# Patient Record
Sex: MALE | Race: BLACK OR AFRICAN AMERICAN | NOT HISPANIC OR LATINO | Employment: UNEMPLOYED | URBAN - METROPOLITAN AREA
[De-identification: names, ages, dates, MRNs, and addresses within clinical notes are randomized per-mention and may not be internally consistent; named-entity substitution may affect disease eponyms.]

---

## 2019-06-24 ENCOUNTER — APPOINTMENT (OUTPATIENT)
Dept: CT IMAGING | Facility: CLINIC | Age: 39
End: 2019-06-24
Attending: EMERGENCY MEDICINE
Payer: MEDICAID

## 2019-06-24 ENCOUNTER — HOSPITAL ENCOUNTER (EMERGENCY)
Facility: CLINIC | Age: 39
Discharge: HOME | End: 2019-06-24
Attending: EMERGENCY MEDICINE | Admitting: EMERGENCY MEDICINE
Payer: MEDICAID

## 2019-06-24 VITALS
OXYGEN SATURATION: 100 % | TEMPERATURE: 98.8 F | RESPIRATION RATE: 18 BRPM | DIASTOLIC BLOOD PRESSURE: 97 MMHG | SYSTOLIC BLOOD PRESSURE: 143 MMHG | HEART RATE: 84 BPM

## 2019-06-24 DIAGNOSIS — N28.9 RENAL INSUFFICIENCY: ICD-10-CM

## 2019-06-24 DIAGNOSIS — N20.0 CALCULUS OF KIDNEY: ICD-10-CM

## 2019-06-24 LAB
ALBUMIN UR-MCNC: 10 MG/DL
ANION GAP SERPL CALCULATED.3IONS-SCNC: 7 MMOL/L (ref 3–14)
APPEARANCE UR: CLEAR
BASOPHILS # BLD AUTO: 0 10E9/L (ref 0–0.2)
BASOPHILS NFR BLD AUTO: 0.3 %
BILIRUB UR QL STRIP: NEGATIVE
BUN SERPL-MCNC: 18 MG/DL (ref 7–30)
CALCIUM SERPL-MCNC: 8.8 MG/DL (ref 8.5–10.1)
CHLORIDE SERPL-SCNC: 107 MMOL/L (ref 94–109)
CO2 SERPL-SCNC: 25 MMOL/L (ref 20–32)
COLOR UR AUTO: YELLOW
CREAT SERPL-MCNC: 1.79 MG/DL (ref 0.66–1.25)
DIFFERENTIAL METHOD BLD: NORMAL
EOSINOPHIL # BLD AUTO: 0.1 10E9/L (ref 0–0.7)
EOSINOPHIL NFR BLD AUTO: 0.8 %
ERYTHROCYTE [DISTWIDTH] IN BLOOD BY AUTOMATED COUNT: 13.3 % (ref 10–15)
GFR SERPL CREATININE-BSD FRML MDRD: 47 ML/MIN/{1.73_M2}
GLUCOSE SERPL-MCNC: 104 MG/DL (ref 70–99)
GLUCOSE UR STRIP-MCNC: NEGATIVE MG/DL
HCT VFR BLD AUTO: 44.2 % (ref 40–53)
HGB BLD-MCNC: 14.8 G/DL (ref 13.3–17.7)
HGB UR QL STRIP: ABNORMAL
IMM GRANULOCYTES # BLD: 0 10E9/L (ref 0–0.4)
IMM GRANULOCYTES NFR BLD: 0.3 %
KETONES UR STRIP-MCNC: ABNORMAL MG/DL
LEUKOCYTE ESTERASE UR QL STRIP: NEGATIVE
LYMPHOCYTES # BLD AUTO: 2.3 10E9/L (ref 0.8–5.3)
LYMPHOCYTES NFR BLD AUTO: 25.4 %
MCH RBC QN AUTO: 29.8 PG (ref 26.5–33)
MCHC RBC AUTO-ENTMCNC: 33.5 G/DL (ref 31.5–36.5)
MCV RBC AUTO: 89 FL (ref 78–100)
MONOCYTES # BLD AUTO: 0.8 10E9/L (ref 0–1.3)
MONOCYTES NFR BLD AUTO: 8.5 %
MUCOUS THREADS #/AREA URNS LPF: PRESENT /LPF
NEUTROPHILS # BLD AUTO: 5.7 10E9/L (ref 1.6–8.3)
NEUTROPHILS NFR BLD AUTO: 64.7 %
NITRATE UR QL: NEGATIVE
NRBC # BLD AUTO: 0 10*3/UL
NRBC BLD AUTO-RTO: 0 /100
PH UR STRIP: 5.5 PH (ref 5–7)
PLATELET # BLD AUTO: 284 10E9/L (ref 150–450)
POTASSIUM SERPL-SCNC: 3.8 MMOL/L (ref 3.4–5.3)
RBC # BLD AUTO: 4.97 10E12/L (ref 4.4–5.9)
RBC #/AREA URNS AUTO: 9 /HPF (ref 0–2)
SODIUM SERPL-SCNC: 139 MMOL/L (ref 133–144)
SOURCE: ABNORMAL
SP GR UR STRIP: 1.02 (ref 1–1.03)
UROBILINOGEN UR STRIP-MCNC: 2 MG/DL (ref 0–2)
WBC # BLD AUTO: 8.9 10E9/L (ref 4–11)
WBC #/AREA URNS AUTO: 4 /HPF (ref 0–5)

## 2019-06-24 PROCEDURE — 25000132 ZZH RX MED GY IP 250 OP 250 PS 637: Performed by: EMERGENCY MEDICINE

## 2019-06-24 PROCEDURE — 74176 CT ABD & PELVIS W/O CONTRAST: CPT

## 2019-06-24 PROCEDURE — 99285 EMERGENCY DEPT VISIT HI MDM: CPT | Mod: 25

## 2019-06-24 PROCEDURE — 80048 BASIC METABOLIC PNL TOTAL CA: CPT | Performed by: EMERGENCY MEDICINE

## 2019-06-24 PROCEDURE — 87591 N.GONORRHOEAE DNA AMP PROB: CPT | Performed by: EMERGENCY MEDICINE

## 2019-06-24 PROCEDURE — 25000128 H RX IP 250 OP 636: Performed by: EMERGENCY MEDICINE

## 2019-06-24 PROCEDURE — 87491 CHLMYD TRACH DNA AMP PROBE: CPT | Performed by: EMERGENCY MEDICINE

## 2019-06-24 PROCEDURE — 96374 THER/PROPH/DIAG INJ IV PUSH: CPT

## 2019-06-24 PROCEDURE — 81001 URINALYSIS AUTO W/SCOPE: CPT | Performed by: EMERGENCY MEDICINE

## 2019-06-24 PROCEDURE — 85025 COMPLETE CBC W/AUTO DIFF WBC: CPT | Performed by: EMERGENCY MEDICINE

## 2019-06-24 PROCEDURE — 96361 HYDRATE IV INFUSION ADD-ON: CPT

## 2019-06-24 RX ORDER — SODIUM CHLORIDE 9 MG/ML
1000 INJECTION, SOLUTION INTRAVENOUS CONTINUOUS
Status: DISCONTINUED | OUTPATIENT
Start: 2019-06-24 | End: 2019-06-25 | Stop reason: HOSPADM

## 2019-06-24 RX ORDER — TAMSULOSIN HYDROCHLORIDE 0.4 MG/1
0.4 CAPSULE ORAL ONCE
Status: COMPLETED | OUTPATIENT
Start: 2019-06-24 | End: 2019-06-24

## 2019-06-24 RX ORDER — KETOROLAC TROMETHAMINE 15 MG/ML
15 INJECTION, SOLUTION INTRAMUSCULAR; INTRAVENOUS ONCE
Status: COMPLETED | OUTPATIENT
Start: 2019-06-24 | End: 2019-06-24

## 2019-06-24 RX ORDER — TAMSULOSIN HYDROCHLORIDE 0.4 MG/1
0.4 CAPSULE ORAL DAILY
Qty: 10 CAPSULE | Refills: 0 | Status: ON HOLD | OUTPATIENT
Start: 2019-06-24 | End: 2019-07-24

## 2019-06-24 RX ORDER — ONDANSETRON 4 MG/1
4 TABLET, ORALLY DISINTEGRATING ORAL EVERY 8 HOURS PRN
Qty: 15 TABLET | Refills: 0 | Status: ON HOLD | OUTPATIENT
Start: 2019-06-24 | End: 2019-07-24

## 2019-06-24 RX ORDER — HYDROCODONE BITARTRATE AND ACETAMINOPHEN 5; 325 MG/1; MG/1
1 TABLET ORAL EVERY 6 HOURS PRN
Qty: 18 TABLET | Refills: 0 | Status: ON HOLD | OUTPATIENT
Start: 2019-06-24 | End: 2019-07-24

## 2019-06-24 RX ADMIN — KETOROLAC TROMETHAMINE 15 MG: 15 INJECTION, SOLUTION INTRAMUSCULAR; INTRAVENOUS at 20:52

## 2019-06-24 RX ADMIN — TAMSULOSIN HYDROCHLORIDE 0.4 MG: 0.4 CAPSULE ORAL at 22:22

## 2019-06-24 RX ADMIN — SODIUM CHLORIDE 1000 ML: 9 INJECTION, SOLUTION INTRAVENOUS at 20:30

## 2019-06-24 NOTE — ED AVS SNAPSHOT
[facility] Emergency Department  [address]  [facility] [address]  Phone:  [phone]  Fax:  [phone]                                    Denver Goff   MRN: [id number]    Department:  [facility] Emergency Department   Date of Visit:  6/24/2019           After Visit Summary Signature Page    I have received my discharge instructions, and my questions have been answered. I have discussed any challenges I see with this plan with the nurse or doctor.    ..........................................................................................................................................  Patient/Patient Representative Signature      ..........................................................................................................................................  Patient Representative Print Name and Relationship to Patient    ..................................................               ................................................  Date                                   Time    ..........................................................................................................................................  Reviewed by Signature/Title    ...................................................              ..............................................  Date                                               Time          22EPIC Rev 08/18

## 2019-06-25 LAB
C TRACH DNA SPEC QL NAA+PROBE: NEGATIVE
N GONORRHOEA DNA SPEC QL NAA+PROBE: NEGATIVE
SPECIMEN SOURCE: NORMAL
SPECIMEN SOURCE: NORMAL

## 2019-06-25 NOTE — ED TRIAGE NOTES
Diagnose with left kidney stones three weeks ago [place], was prescribed bactrim and Tamusolin. Flank pain 2 days ago with burning urination. Tylenol taken at 6pm that did not help. ABCs intact.

## 2019-06-25 NOTE — DISCHARGE INSTRUCTIONS
Drink lots of fluids.  Flomax to help urine flow with kidney stone for the next few days (until you pass the stone).  Strain urine.  Save stone for followup with PCP or urology.  Colace to avoid constipation while taking narcotics for pain and spasm.  Zofran for nausea.     Be seen later this week- either PCP or urology.  Your kidney function was slightly elevated and should get better with passage of the stone.

## 2019-06-25 NOTE — ED PROVIDER NOTES
History     Chief Complaint:    Flank Pain      HPI   Denver Goff is a 38 year old male who presents with left flank pain.  Denver is a 38-year-old gentleman who was diagnosed with a left-sided kidney stone about 3 weeks ago when he was in [facility].  He has been taking Bactrim (antibiotic) even though he does not think he had an infection in addition of Flomax.  He does report burning urination and flank pain starting 2 days ago.  Tylenol is not relieving the pain.  He is felt nauseated but not had any fevers or vomiting is otherwise acting normal.  Patient does not think he has passed the kidney stone.  No fevers.  Mild nausea.  Pain is 4/10 constant.  CT scan from [place] is as noted below:    CT ABD AND PELVIS NO CONTRAST6/8/2019  [facility]  Result Impression     2.2 mm calculus within the proximal left ureter, resulting in mild left hydroureteronephrosis and mild left periureteral stranding    Electronically signed by Abhishek Cortez MD. 6/8/2019 12:33 PM   Result Narrative   EXAM:  CT ABD AND PELVIS NO CONTRAST  EXAM DATE AND TIME:  6/8/2019 11:35 AM    HISTORY: Left Flank Pain    COMPARISON: None available    TECHNIQUE: CT scan of the abdomen and pelvis was performed.  No IV contrast was administered. Oral contrast was not administered. Coronal and sagittal reformats were performed.    FINDINGS:      Limited images through the lung bases demonstrate no consolidation or pleural effusion.    Evaluation of the solid abdominal viscera is limited in the absence of intermediate is contrast.    A 2.2 mm calculus is present within the proximal left ureter, resulting in mild left hydroureteronephrosis and mild left periureteral stranding. No additional left renal or ureteral calculi are identified. The left kidney is grossly unremarkable.    No right hydroureteronephrosis, renal calculus, or ureteral calculus is identified. The right kidney is grossly normal in size and  attenuation.    The visualized portions of the remaining solid abdominal viscera are grossly normal in size and attenuation.    There is no bowel dilatation or obstruction. The appendix is normal in course and caliber. No right lower quadrant inflammatory changes are seen to suggest appendicitis.    There is no ascites or lymphadenopathy.    The urinary bladder is underdistended, limiting evaluation.    No suspicious osseous lesion is identified.   Other Result Information   Interface, [id number] - 06/08/2019 12:35 PM PDT  EXAM:  CT ABD AND PELVIS NO CONTRAST  EXAM DATE AND TIME:  6/8/2019 11:35 AM    HISTORY: Left Flank Pain    COMPARISON: None available    TECHNIQUE: CT scan of the abdomen and pelvis was performed.  No IV contrast was administered. Oral contrast was not administered. Coronal and sagittal reformats were performed.    FINDINGS:      Limited images through the lung bases demonstrate no consolidation or pleural effusion.    Evaluation of the solid abdominal viscera is limited in the absence of intermediate is contrast.    A 2.2 mm calculus is present within the proximal left ureter, resulting in mild left hydroureteronephrosis and mild left periureteral stranding. No additional left renal or ureteral calculi are identified. The left kidney is grossly unremarkable.    No right hydroureteronephrosis, renal calculus, or ureteral calculus is identified. The right kidney is grossly normal in size and attenuation.    The visualized portions of the remaining solid abdominal viscera are grossly normal in size and attenuation.    There is no bowel dilatation or obstruction. The appendix is normal in course and caliber. No right lower quadrant inflammatory changes are seen to suggest appendicitis.    There is no ascites or lymphadenopathy.    The urinary bladder is underdistended, limiting evaluation.    No suspicious osseous lesion is identified.    IMPRESSION:    2.2 mm calculus within the proximal left  "ureter, resulting in mild left hydroureteronephrosis and mild left periureteral stranding    Electronically signed by Abhishek Cortez MD. 6/8/2019 12:33 PM         Allergies:  No Known Allergies     Medications:    Flomax  Bactrim- finished      Past Medical History:    Kidney stones with \"bacteria\"  Double chamber right ventricle   Heart murmur  Chest pain   Shortness of breath   Hyperlipidemia  Overweight      Past surgical history:  Cardiac surgery at age 28    Family History:    Lung Cancer  Lipid disorder  Hypertension      Social History:  The patient was accompanied to the ED by nobody.  Smoking Status: Former Smoker  Smokeless Tobacco: Current User  Alcohol Use: No   Marital Status:         Review of Systems  All other ROS reviewed and negative.  Diagnose with left kidney stones three weeks ago [place], was prescribed bactrim and Tamusolin. Flank pain 2 days ago with burning urination. Tylenol taken at 6pm that did not help.     Physical Exam   First Vitals:  BP: (!) 143/97  Pulse: 84  Heart Rate: 84  Temp: 98.8  F (37.1  C)  Resp: 18  SpO2: 100 %      Physical Exam  GEN: patient appears tired  HEAD: atraumatic, normocephalic  EYES: pupils reactive, conjunctivae normal  ENT: TMs flat and white bilaterally, oropharynx normal with no erythema or exudate, mucus membranes dry  NECK: no cervical LAD  RESPIRATORY: no tachypnea, breath sounds clear to auscultation (no rales, wheezes, rhonchi), chest wall nontender, normal phonation  CVS: normal S1/S2, no murmurs/rubs/gallops  ABDOMEN: soft, nontender, no masses or organomegaly, no rebound, decreased bowel sounds  BACK: no costovertebral angle tenderness  EXTREMITIES: intact pulses x 4, full range of motion at joints, no /edema  MUSCULOSKELETAL: no deformities  SKIN: warm and dry, no acute rashes  NEURO: GCS 15, cranial nerves intact.  Motor- moves all 4 extremities.  Sensation- intact.  Coordination-ambulatory.  Overall symmetrical exam  HEME: no bruising or " petechiae/contusions  LYMPH: no lymphadenopathy    Emergency Department Course         Imaging:  CT scan as per below:    Laboratory:  Results for orders placed or performed during the hospital encounter of 06/24/19 (from the past 24 hour(s))   CBC with platelets differential   Result Value Ref Range    WBC 8.9 4.0 - 11.0 10e9/L    RBC Count 4.97 4.4 - 5.9 10e12/L    Hemoglobin 14.8 13.3 - 17.7 g/dL    Hematocrit 44.2 40.0 - 53.0 %    MCV 89 78 - 100 fl    MCH 29.8 26.5 - 33.0 pg    MCHC 33.5 31.5 - 36.5 g/dL    RDW 13.3 10.0 - 15.0 %    Platelet Count 284 150 - 450 10e9/L    Diff Method Automated Method     % Neutrophils 64.7 %    % Lymphocytes 25.4 %    % Monocytes 8.5 %    % Eosinophils 0.8 %    % Basophils 0.3 %    % Immature Granulocytes 0.3 %    Nucleated RBCs 0 0 /100    Absolute Neutrophil 5.7 1.6 - 8.3 10e9/L    Absolute Lymphocytes 2.3 0.8 - 5.3 10e9/L    Absolute Monocytes 0.8 0.0 - 1.3 10e9/L    Absolute Eosinophils 0.1 0.0 - 0.7 10e9/L    Absolute Basophils 0.0 0.0 - 0.2 10e9/L    Abs Immature Granulocytes 0.0 0 - 0.4 10e9/L    Absolute Nucleated RBC 0.0    Basic metabolic panel   Result Value Ref Range    Sodium 139 133 - 144 mmol/L    Potassium 3.8 3.4 - 5.3 mmol/L    Chloride 107 94 - 109 mmol/L    Carbon Dioxide 25 20 - 32 mmol/L    Anion Gap 7 3 - 14 mmol/L    Glucose 104 (H) 70 - 99 mg/dL    Urea Nitrogen 18 7 - 30 mg/dL    Creatinine 1.79 (H) 0.66 - 1.25 mg/dL    GFR Estimate 47 (L) >60 mL/min/[1.73_m2]    GFR Estimate If Black 54 (L) >60 mL/min/[1.73_m2]    Calcium 8.8 8.5 - 10.1 mg/dL   UA with Microscopic   Result Value Ref Range    Color Urine Yellow     Appearance Urine Clear     Glucose Urine Negative NEG^Negative mg/dL    Bilirubin Urine Negative NEG^Negative    Ketones Urine Trace (A) NEG^Negative mg/dL    Specific Gravity Urine 1.025 1.003 - 1.035    Blood Urine Small (A) NEG^Negative    pH Urine 5.5 5.0 - 7.0 pH    Protein Albumin Urine 10 (A) NEG^Negative mg/dL    Urobilinogen mg/dL  2.0 0.0 - 2.0 mg/dL    Nitrite Urine Negative NEG^Negative    Leukocyte Esterase Urine Negative NEG^Negative    Source Midstream Urine     WBC Urine 4 0 - 5 /HPF    RBC Urine 9 (H) 0 - 2 /HPF    Mucous Urine Present (A) NEG^Negative /LPF   Abd/pelvis CT no contrast - Stone Protocol    Narrative    CT ABDOMEN AND PELVIS WITHOUT CONTRAST  6/24/2019  9:31 PM     HISTORY: Flank pain.    COMPARISON: None.    TECHNIQUE: Axial CT images of the abdomen and pelvis from the  diaphragm to the symphysis pubis were acquired without IV contrast.  Radiation dose for this scan was reduced using automated exposure  control, adjustment of the mA and/or kV according to patient size, or  iterative reconstruction technique.    FINDINGS: 3 mm stone in the distal left ureter 1-2 cm from the  ureterovesicular junction with mild proximal hydronephrosis and  stranding. There is no right perinephric fat stranding. Kidneys are  normal in size and configuration. No other renal, ureteral, or bladder  stones. No free air or free fluid. There are no dilated loops of small  intestine or large bowel to suggest ileus or obstruction. Contracted  gallbladder. Visualized portions of the liver unremarkable. No  splenomegaly. No definite adrenal nodules. Pancreas grossly  unremarkable. The remainder of the visualized abdomen is unremarkable  on this noncontrast scan. Survey of the visualized bony structures  demonstrates no destructive bony lesions. The visualized lung bases  are unremarkable.       Impression    IMPRESSION: 3 mm stone in the distal left ureter with mild proximal  hydronephrosis and stranding.    ANATOLY MAYEN MD         Interventions:  2030 NS Bolus 1,000mL IV   2052 Toradol 15 mg IV  Medications   0.9% sodium chloride BOLUS (1,000 mLs Intravenous New Bag 6/24/19 2030)     Followed by   sodium chloride 0.9% infusion (has no administration in time range)   ketorolac (TORADOL) injection 15 mg (15 mg Intravenous Given 6/24/19 2052)     Patrick  Cardiac/Sp02 monitoring    Emergency Department Course:  10:06 PM recheck    10:17 PM Dr Hardy called back, states ok to followup outpatient    Discussed results with patient.  Gave patient copies of all results (applicable labs, CT scans and/or ultrasounds).  Answered questions.  Asked patient to followup with PCP.  Circled any abnormal lab values and asked patient to followup with PCP.    Impression & Plan             Medical Decision Making:  Ramin is a 38-year-old gentleman who is in [facility] just a couple weeks ago and was diagnosed with a kidney stone.  He reports that he does not think it was passed.  He was able to urinate for us today and his urine shows trace ketones and 9 RBCs but no evidence of infection.  The patient has been taking Bactrim because he was told that he possibly had a UTI and then also taken his course of Flomax and he just presents here because the pain is getting worse.   His CBC is otherwise normal but his BMP today is shown his creatinine is 1.79.  An IV was placed and labs were sent we were able to obtain his old CT scan (see above).  We were able to get his results from from the [facility] and that showed a 2.2 mm stone in the distal left ureter.  Patient received IV fluids and a urine strainer.   CT scan today shows a 3 mm stone in the distal left ureter.  Patient's labs from [facility] are reviewed and his chemistries showed a normal creatinine on 6/ 8.   Patient does have a significant history of having open heart surgery so he essentially is having acute renal insufficiency.    I discussed the case with the urologist and he felt like the patient could go home with close followup.  Patient admits that he is not taking his flomax daily and intermittently.  I stressed pushing fluids and hydration.  Plan- strain urine, flomax, pain medicine.  Patient to followup with urology or PCP in 1-2 days.  Recheck kidney function.    Patient wished for discharge home vs admit and I  agree with the plan.    Diagnosis:  Kidney stones/nephrolothiasis  Acute renal insufficiency    Disposition:  discharged to home  Instructions to patient:  Drink lots of fluids.  Flomax to help urine flow with kidney stone for the next few days (until you pass the stone).  Strain urine.  Save stone for followup with PCP or urology.  Colace to avoid constipation while taking narcotics for pain and spasm.  Zofran for nausea.    Recheck kidney function.    Lorena Hutson  6/24/2019   [facility] EMERGENCY DEPARTMENT       Lorena Hutson MD  06/26/19 0813

## 2019-06-25 NOTE — RESULT ENCOUNTER NOTE
Final result for both N. Gonorrhoeae PCR and Chlamydia Trachomatis PCR are NEGATIVE.  No treatment or change in treatment per [place] ED Lab Result protocol.

## 2019-07-23 ENCOUNTER — APPOINTMENT (OUTPATIENT)
Dept: CT IMAGING | Facility: CLINIC | Age: 39
End: 2019-07-23
Attending: EMERGENCY MEDICINE
Payer: MEDICAID

## 2019-07-23 ENCOUNTER — HOSPITAL ENCOUNTER (OUTPATIENT)
Facility: CLINIC | Age: 39
Setting detail: OBSERVATION
Discharge: HOME | End: 2019-07-24
Attending: EMERGENCY MEDICINE | Admitting: INTERNAL MEDICINE
Payer: MEDICAID

## 2019-07-23 DIAGNOSIS — N23 RENAL COLIC: Primary | ICD-10-CM

## 2019-07-23 DIAGNOSIS — N20.0 KIDNEY STONE: ICD-10-CM

## 2019-07-23 LAB
ALBUMIN UR-MCNC: 20 MG/DL
ANION GAP SERPL CALCULATED.3IONS-SCNC: 7 MMOL/L (ref 3–14)
APPEARANCE UR: CLEAR
BASOPHILS # BLD AUTO: 0 10E9/L (ref 0–0.2)
BASOPHILS NFR BLD AUTO: 0.2 %
BILIRUB UR QL STRIP: NEGATIVE
BUN SERPL-MCNC: 17 MG/DL (ref 7–30)
CALCIUM SERPL-MCNC: 8.9 MG/DL (ref 8.5–10.1)
CHLORIDE SERPL-SCNC: 104 MMOL/L (ref 94–109)
CO2 SERPL-SCNC: 25 MMOL/L (ref 20–32)
COLOR UR AUTO: YELLOW
CREAT SERPL-MCNC: 1.27 MG/DL (ref 0.66–1.25)
DIFFERENTIAL METHOD BLD: NORMAL
EOSINOPHIL # BLD AUTO: 0 10E9/L (ref 0–0.7)
EOSINOPHIL NFR BLD AUTO: 0.3 %
ERYTHROCYTE [DISTWIDTH] IN BLOOD BY AUTOMATED COUNT: 13.3 % (ref 10–15)
GFR SERPL CREATININE-BSD FRML MDRD: 71 ML/MIN/{1.73_M2}
GLUCOSE SERPL-MCNC: 107 MG/DL (ref 70–99)
GLUCOSE UR STRIP-MCNC: NEGATIVE MG/DL
HCT VFR BLD AUTO: 44 % (ref 40–53)
HGB BLD-MCNC: 14.8 G/DL (ref 13.3–17.7)
HGB UR QL STRIP: ABNORMAL
IMM GRANULOCYTES # BLD: 0 10E9/L (ref 0–0.4)
IMM GRANULOCYTES NFR BLD: 0.3 %
KETONES UR STRIP-MCNC: NEGATIVE MG/DL
LEUKOCYTE ESTERASE UR QL STRIP: NEGATIVE
LYMPHOCYTES # BLD AUTO: 1.1 10E9/L (ref 0.8–5.3)
LYMPHOCYTES NFR BLD AUTO: 12.2 %
MCH RBC QN AUTO: 30 PG (ref 26.5–33)
MCHC RBC AUTO-ENTMCNC: 33.6 G/DL (ref 31.5–36.5)
MCV RBC AUTO: 89 FL (ref 78–100)
MONOCYTES # BLD AUTO: 0.8 10E9/L (ref 0–1.3)
MONOCYTES NFR BLD AUTO: 8.4 %
MUCOUS THREADS #/AREA URNS LPF: PRESENT /LPF
NEUTROPHILS # BLD AUTO: 7.2 10E9/L (ref 1.6–8.3)
NEUTROPHILS NFR BLD AUTO: 78.6 %
NITRATE UR QL: NEGATIVE
NRBC # BLD AUTO: 0 10*3/UL
NRBC BLD AUTO-RTO: 0 /100
PH UR STRIP: 5 PH (ref 5–7)
PLATELET # BLD AUTO: 250 10E9/L (ref 150–450)
POTASSIUM SERPL-SCNC: 4 MMOL/L (ref 3.4–5.3)
RBC # BLD AUTO: 4.94 10E12/L (ref 4.4–5.9)
RBC #/AREA URNS AUTO: 19 /HPF (ref 0–2)
SODIUM SERPL-SCNC: 136 MMOL/L (ref 133–144)
SOURCE: ABNORMAL
SP GR UR STRIP: >1.035 (ref 1–1.03)
SQUAMOUS #/AREA URNS AUTO: <1 /HPF (ref 0–1)
TRANS CELLS #/AREA URNS HPF: 1 /HPF (ref 0–1)
UROBILINOGEN UR STRIP-MCNC: NORMAL MG/DL (ref 0–2)
WBC # BLD AUTO: 9.2 10E9/L (ref 4–11)
WBC #/AREA URNS AUTO: 4 /HPF (ref 0–5)

## 2019-07-23 PROCEDURE — 74176 CT ABD & PELVIS W/O CONTRAST: CPT

## 2019-07-23 PROCEDURE — 96374 THER/PROPH/DIAG INJ IV PUSH: CPT

## 2019-07-23 PROCEDURE — 80048 BASIC METABOLIC PNL TOTAL CA: CPT | Performed by: EMERGENCY MEDICINE

## 2019-07-23 PROCEDURE — 96375 TX/PRO/DX INJ NEW DRUG ADDON: CPT

## 2019-07-23 PROCEDURE — 85025 COMPLETE CBC W/AUTO DIFF WBC: CPT | Performed by: EMERGENCY MEDICINE

## 2019-07-23 PROCEDURE — 25000128 H RX IP 250 OP 636: Performed by: EMERGENCY MEDICINE

## 2019-07-23 PROCEDURE — 81001 URINALYSIS AUTO W/SCOPE: CPT | Performed by: EMERGENCY MEDICINE

## 2019-07-23 PROCEDURE — 96361 HYDRATE IV INFUSION ADD-ON: CPT

## 2019-07-23 PROCEDURE — 99285 EMERGENCY DEPT VISIT HI MDM: CPT | Mod: 25

## 2019-07-23 RX ORDER — ONDANSETRON 2 MG/ML
4 INJECTION INTRAMUSCULAR; INTRAVENOUS ONCE
Status: COMPLETED | OUTPATIENT
Start: 2019-07-23 | End: 2019-07-23

## 2019-07-23 RX ORDER — HYDROMORPHONE HYDROCHLORIDE 1 MG/ML
0.5 INJECTION, SOLUTION INTRAMUSCULAR; INTRAVENOUS; SUBCUTANEOUS ONCE
Status: COMPLETED | OUTPATIENT
Start: 2019-07-23 | End: 2019-07-23

## 2019-07-23 RX ADMIN — ONDANSETRON HYDROCHLORIDE 4 MG: 2 INJECTION, SOLUTION INTRAMUSCULAR; INTRAVENOUS at 22:55

## 2019-07-23 RX ADMIN — HYDROMORPHONE HYDROCHLORIDE 0.5 MG: 1 INJECTION, SOLUTION INTRAMUSCULAR; INTRAVENOUS; SUBCUTANEOUS at 22:55

## 2019-07-23 ASSESSMENT — ENCOUNTER SYMPTOMS
DYSURIA: 1
HEMATURIA: 0
NAUSEA: 1
ROS GI COMMENTS: POSITIVE FOR MELENA.
VOMITING: 1
FLANK PAIN: 1

## 2019-07-24 ENCOUNTER — ANESTHESIA (OUTPATIENT)
Dept: SURGERY | Facility: CLINIC | Age: 39
End: 2019-07-24
Payer: MEDICAID

## 2019-07-24 ENCOUNTER — APPOINTMENT (OUTPATIENT)
Dept: GENERAL RADIOLOGY | Facility: CLINIC | Age: 39
End: 2019-07-24
Attending: UROLOGY
Payer: MEDICAID

## 2019-07-24 ENCOUNTER — ANESTHESIA EVENT (OUTPATIENT)
Dept: SURGERY | Facility: CLINIC | Age: 39
End: 2019-07-24
Payer: MEDICAID

## 2019-07-24 VITALS
TEMPERATURE: 97.2 F | BODY MASS INDEX: 30.46 KG/M2 | RESPIRATION RATE: 20 BRPM | OXYGEN SATURATION: 95 % | SYSTOLIC BLOOD PRESSURE: 132 MMHG | HEIGHT: 67 IN | DIASTOLIC BLOOD PRESSURE: 90 MMHG | WEIGHT: 194.1 LBS | HEART RATE: 83 BPM

## 2019-07-24 PROBLEM — N23 RENAL COLIC: Status: ACTIVE | Noted: 2019-07-24

## 2019-07-24 LAB
ANION GAP SERPL CALCULATED.3IONS-SCNC: 5 MMOL/L (ref 3–14)
BUN SERPL-MCNC: 15 MG/DL (ref 7–30)
CALCIUM SERPL-MCNC: 8.3 MG/DL (ref 8.5–10.1)
CHLORIDE SERPL-SCNC: 105 MMOL/L (ref 94–109)
CO2 SERPL-SCNC: 27 MMOL/L (ref 20–32)
COPATH REPORT: NORMAL
CREAT SERPL-MCNC: 1.27 MG/DL (ref 0.66–1.25)
GFR SERPL CREATININE-BSD FRML MDRD: 71 ML/MIN/{1.73_M2}
GLUCOSE SERPL-MCNC: 102 MG/DL (ref 70–99)
INTERPRETATION ECG - MUSE: NORMAL
POTASSIUM SERPL-SCNC: 3.8 MMOL/L (ref 3.4–5.3)
SODIUM SERPL-SCNC: 137 MMOL/L (ref 133–144)

## 2019-07-24 PROCEDURE — 96361 HYDRATE IV INFUSION ADD-ON: CPT

## 2019-07-24 PROCEDURE — 25800030 ZZH RX IP 258 OP 636: Performed by: INTERNAL MEDICINE

## 2019-07-24 PROCEDURE — 25000128 H RX IP 250 OP 636: Performed by: UROLOGY

## 2019-07-24 PROCEDURE — 37000009 ZZH ANESTHESIA TECHNICAL FEE, EACH ADDTL 15 MIN: Performed by: UROLOGY

## 2019-07-24 PROCEDURE — G0378 HOSPITAL OBSERVATION PER HR: HCPCS

## 2019-07-24 PROCEDURE — 52352 CYSTOURETERO W/STONE REMOVE: CPT | Mod: LT | Performed by: UROLOGY

## 2019-07-24 PROCEDURE — 99203 OFFICE O/P NEW LOW 30 MIN: CPT | Mod: 57 | Performed by: UROLOGY

## 2019-07-24 PROCEDURE — 25000125 ZZHC RX 250: Performed by: NURSE ANESTHETIST, CERTIFIED REGISTERED

## 2019-07-24 PROCEDURE — 71000012 ZZH RECOVERY PHASE 1 LEVEL 1 FIRST HR: Performed by: UROLOGY

## 2019-07-24 PROCEDURE — 25000125 ZZHC RX 250: Performed by: UROLOGY

## 2019-07-24 PROCEDURE — 99207 ZZC CDG-CODE CATEGORY CHANGED: CPT | Performed by: INTERNAL MEDICINE

## 2019-07-24 PROCEDURE — 36415 COLL VENOUS BLD VENIPUNCTURE: CPT | Performed by: INTERNAL MEDICINE

## 2019-07-24 PROCEDURE — C2617 STENT, NON-COR, TEM W/O DEL: HCPCS | Performed by: UROLOGY

## 2019-07-24 PROCEDURE — 80048 BASIC METABOLIC PNL TOTAL CA: CPT | Performed by: INTERNAL MEDICINE

## 2019-07-24 PROCEDURE — 99219 ZZC INITIAL OBSERVATION CARE,LEVL II: CPT | Performed by: INTERNAL MEDICINE

## 2019-07-24 PROCEDURE — 88300 SURGICAL PATH GROSS: CPT | Mod: 26 | Performed by: INTERNAL MEDICINE

## 2019-07-24 PROCEDURE — 82365 CALCULUS SPECTROSCOPY: CPT | Performed by: UROLOGY

## 2019-07-24 PROCEDURE — 25500064 ZZH RX 255 OP 636: Performed by: UROLOGY

## 2019-07-24 PROCEDURE — 40000306 ZZH STATISTIC PRE PROC ASSESS II: Performed by: UROLOGY

## 2019-07-24 PROCEDURE — 25000128 H RX IP 250 OP 636: Performed by: INTERNAL MEDICINE

## 2019-07-24 PROCEDURE — 93010 ELECTROCARDIOGRAM REPORT: CPT | Performed by: INTERNAL MEDICINE

## 2019-07-24 PROCEDURE — 96376 TX/PRO/DX INJ SAME DRUG ADON: CPT

## 2019-07-24 PROCEDURE — 36000054 ZZH SURGERY LEVEL 2 W FLUORO 1ST 30 MIN: Performed by: UROLOGY

## 2019-07-24 PROCEDURE — C1769 GUIDE WIRE: HCPCS | Performed by: UROLOGY

## 2019-07-24 PROCEDURE — 88300 SURGICAL PATH GROSS: CPT | Performed by: INTERNAL MEDICINE

## 2019-07-24 PROCEDURE — 40000277 XR SURGERY CARM FLUORO LESS THAN 5 MIN W STILLS: Mod: TC

## 2019-07-24 PROCEDURE — 99207 ZZC APP CREDIT; MD BILLING SHARED VISIT: CPT | Performed by: PHYSICIAN ASSISTANT

## 2019-07-24 PROCEDURE — 37000008 ZZH ANESTHESIA TECHNICAL FEE, 1ST 30 MIN: Performed by: UROLOGY

## 2019-07-24 PROCEDURE — 74420 UROGRAPHY RTRGR +-KUB: CPT | Mod: 26 | Performed by: UROLOGY

## 2019-07-24 PROCEDURE — 25000128 H RX IP 250 OP 636: Performed by: NURSE ANESTHETIST, CERTIFIED REGISTERED

## 2019-07-24 PROCEDURE — 27210794 ZZH OR GENERAL SUPPLY STERILE: Performed by: UROLOGY

## 2019-07-24 PROCEDURE — 52332 CYSTOSCOPY AND TREATMENT: CPT | Mod: LT | Performed by: UROLOGY

## 2019-07-24 PROCEDURE — 36000052 ZZH SURGERY LEVEL 2 EA 15 ADDTL MIN: Performed by: UROLOGY

## 2019-07-24 PROCEDURE — 25000128 H RX IP 250 OP 636: Performed by: EMERGENCY MEDICINE

## 2019-07-24 PROCEDURE — 71000027 ZZH RECOVERY PHASE 2 EACH 15 MINS: Performed by: UROLOGY

## 2019-07-24 DEVICE — STENT URETERAL POLARIS ULTRA 5FRX26CM M0061921230: Type: IMPLANTABLE DEVICE | Status: FUNCTIONAL

## 2019-07-24 RX ORDER — SODIUM CHLORIDE, SODIUM LACTATE, POTASSIUM CHLORIDE, CALCIUM CHLORIDE 600; 310; 30; 20 MG/100ML; MG/100ML; MG/100ML; MG/100ML
INJECTION, SOLUTION INTRAVENOUS CONTINUOUS
Status: CANCELLED | OUTPATIENT
Start: 2019-07-24

## 2019-07-24 RX ORDER — GLYCOPYRROLATE 0.2 MG/ML
INJECTION, SOLUTION INTRAMUSCULAR; INTRAVENOUS PRN
Status: DISCONTINUED | OUTPATIENT
Start: 2019-07-24 | End: 2019-07-24

## 2019-07-24 RX ORDER — DEXAMETHASONE SODIUM PHOSPHATE 4 MG/ML
INJECTION, SOLUTION INTRA-ARTICULAR; INTRALESIONAL; INTRAMUSCULAR; INTRAVENOUS; SOFT TISSUE PRN
Status: DISCONTINUED | OUTPATIENT
Start: 2019-07-24 | End: 2019-07-24

## 2019-07-24 RX ORDER — NALOXONE HYDROCHLORIDE 0.4 MG/ML
.1-.4 INJECTION, SOLUTION INTRAMUSCULAR; INTRAVENOUS; SUBCUTANEOUS
Status: DISCONTINUED | OUTPATIENT
Start: 2019-07-24 | End: 2019-07-24 | Stop reason: HOSPADM

## 2019-07-24 RX ORDER — ACETAMINOPHEN 325 MG/1
650 TABLET ORAL EVERY 4 HOURS PRN
Status: DISCONTINUED | OUTPATIENT
Start: 2019-07-24 | End: 2019-07-24 | Stop reason: HOSPADM

## 2019-07-24 RX ORDER — HYDROCODONE BITARTRATE AND ACETAMINOPHEN 5; 325 MG/1; MG/1
1-2 TABLET ORAL EVERY 4 HOURS PRN
Qty: 5 TABLET | Refills: 0 | Status: SHIPPED | OUTPATIENT
Start: 2019-07-24 | End: 2019-09-05

## 2019-07-24 RX ORDER — ONDANSETRON 4 MG/1
4 TABLET, ORALLY DISINTEGRATING ORAL EVERY 6 HOURS PRN
Status: DISCONTINUED | OUTPATIENT
Start: 2019-07-24 | End: 2019-07-24 | Stop reason: HOSPADM

## 2019-07-24 RX ORDER — TAMSULOSIN HYDROCHLORIDE 0.4 MG/1
0.4 CAPSULE ORAL DAILY
Qty: 9 CAPSULE | Refills: 0 | Status: SHIPPED | OUTPATIENT
Start: 2019-07-24 | End: 2019-09-05

## 2019-07-24 RX ORDER — HYDROMORPHONE HYDROCHLORIDE 1 MG/ML
.3-.5 INJECTION, SOLUTION INTRAMUSCULAR; INTRAVENOUS; SUBCUTANEOUS EVERY 10 MIN PRN
Status: CANCELLED | OUTPATIENT
Start: 2019-07-24

## 2019-07-24 RX ORDER — DIMENHYDRINATE 50 MG/ML
25 INJECTION, SOLUTION INTRAMUSCULAR; INTRAVENOUS
Status: CANCELLED | OUTPATIENT
Start: 2019-07-24

## 2019-07-24 RX ORDER — ATROPA BELLADONNA AND OPIUM 16.2; 3 MG/1; MG/1
SUPPOSITORY RECTAL PRN
Status: DISCONTINUED | OUTPATIENT
Start: 2019-07-24 | End: 2019-07-24 | Stop reason: HOSPADM

## 2019-07-24 RX ORDER — ONDANSETRON 4 MG/1
4 TABLET, ORALLY DISINTEGRATING ORAL EVERY 30 MIN PRN
Status: CANCELLED | OUTPATIENT
Start: 2019-07-24

## 2019-07-24 RX ORDER — AMOXICILLIN 250 MG
1 CAPSULE ORAL 2 TIMES DAILY PRN
Status: DISCONTINUED | OUTPATIENT
Start: 2019-07-24 | End: 2019-07-24 | Stop reason: HOSPADM

## 2019-07-24 RX ORDER — AMOXICILLIN 250 MG
2 CAPSULE ORAL 2 TIMES DAILY PRN
Status: DISCONTINUED | OUTPATIENT
Start: 2019-07-24 | End: 2019-07-24 | Stop reason: HOSPADM

## 2019-07-24 RX ORDER — FENTANYL CITRATE 50 UG/ML
25-50 INJECTION, SOLUTION INTRAMUSCULAR; INTRAVENOUS
Status: CANCELLED | OUTPATIENT
Start: 2019-07-24

## 2019-07-24 RX ORDER — LIDOCAINE HYDROCHLORIDE 10 MG/ML
INJECTION, SOLUTION INFILTRATION; PERINEURAL PRN
Status: DISCONTINUED | OUTPATIENT
Start: 2019-07-24 | End: 2019-07-24

## 2019-07-24 RX ORDER — ONDANSETRON 2 MG/ML
INJECTION INTRAMUSCULAR; INTRAVENOUS PRN
Status: DISCONTINUED | OUTPATIENT
Start: 2019-07-24 | End: 2019-07-24

## 2019-07-24 RX ORDER — ALBUTEROL SULFATE 0.83 MG/ML
2.5 SOLUTION RESPIRATORY (INHALATION) EVERY 4 HOURS PRN
Status: CANCELLED | OUTPATIENT
Start: 2019-07-24

## 2019-07-24 RX ORDER — MEPERIDINE HYDROCHLORIDE 25 MG/ML
12.5 INJECTION INTRAMUSCULAR; INTRAVENOUS; SUBCUTANEOUS
Status: CANCELLED | OUTPATIENT
Start: 2019-07-24

## 2019-07-24 RX ORDER — PROPOFOL 10 MG/ML
INJECTION, EMULSION INTRAVENOUS PRN
Status: DISCONTINUED | OUTPATIENT
Start: 2019-07-24 | End: 2019-07-24

## 2019-07-24 RX ORDER — CEFAZOLIN SODIUM 2 G/100ML
2 INJECTION, SOLUTION INTRAVENOUS
Status: COMPLETED | OUTPATIENT
Start: 2019-07-24 | End: 2019-07-24

## 2019-07-24 RX ORDER — SODIUM CHLORIDE, SODIUM LACTATE, POTASSIUM CHLORIDE, CALCIUM CHLORIDE 600; 310; 30; 20 MG/100ML; MG/100ML; MG/100ML; MG/100ML
INJECTION, SOLUTION INTRAVENOUS CONTINUOUS
Status: DISCONTINUED | OUTPATIENT
Start: 2019-07-24 | End: 2019-07-24 | Stop reason: HOSPADM

## 2019-07-24 RX ORDER — OXYCODONE HYDROCHLORIDE 5 MG/1
5-10 TABLET ORAL
Status: DISCONTINUED | OUTPATIENT
Start: 2019-07-24 | End: 2019-07-24 | Stop reason: HOSPADM

## 2019-07-24 RX ORDER — FENTANYL CITRATE 50 UG/ML
INJECTION, SOLUTION INTRAMUSCULAR; INTRAVENOUS PRN
Status: DISCONTINUED | OUTPATIENT
Start: 2019-07-24 | End: 2019-07-24

## 2019-07-24 RX ORDER — TAMSULOSIN HYDROCHLORIDE 0.4 MG/1
0.4 CAPSULE ORAL DAILY
Status: DISCONTINUED | OUTPATIENT
Start: 2019-07-24 | End: 2019-07-24

## 2019-07-24 RX ORDER — LANOLIN ALCOHOL/MO/W.PET/CERES
3 CREAM (GRAM) TOPICAL
Status: DISCONTINUED | OUTPATIENT
Start: 2019-07-24 | End: 2019-07-24 | Stop reason: HOSPADM

## 2019-07-24 RX ORDER — HYDROMORPHONE HYDROCHLORIDE 1 MG/ML
.3-.5 INJECTION, SOLUTION INTRAMUSCULAR; INTRAVENOUS; SUBCUTANEOUS
Status: DISCONTINUED | OUTPATIENT
Start: 2019-07-24 | End: 2019-07-24 | Stop reason: HOSPADM

## 2019-07-24 RX ORDER — LIDOCAINE 40 MG/G
CREAM TOPICAL
Status: CANCELLED | OUTPATIENT
Start: 2019-07-24

## 2019-07-24 RX ORDER — CEFAZOLIN SODIUM 1 G/50ML
1 INJECTION, SOLUTION INTRAVENOUS SEE ADMIN INSTRUCTIONS
Status: DISCONTINUED | OUTPATIENT
Start: 2019-07-24 | End: 2019-07-24 | Stop reason: HOSPADM

## 2019-07-24 RX ORDER — BISACODYL 10 MG
10 SUPPOSITORY, RECTAL RECTAL DAILY PRN
Status: DISCONTINUED | OUTPATIENT
Start: 2019-07-24 | End: 2019-07-24 | Stop reason: HOSPADM

## 2019-07-24 RX ORDER — NALOXONE HYDROCHLORIDE 0.4 MG/ML
.1-.4 INJECTION, SOLUTION INTRAMUSCULAR; INTRAVENOUS; SUBCUTANEOUS
Status: CANCELLED | OUTPATIENT
Start: 2019-07-24 | End: 2019-07-25

## 2019-07-24 RX ORDER — ONDANSETRON 2 MG/ML
4 INJECTION INTRAMUSCULAR; INTRAVENOUS EVERY 6 HOURS PRN
Status: DISCONTINUED | OUTPATIENT
Start: 2019-07-24 | End: 2019-07-24 | Stop reason: HOSPADM

## 2019-07-24 RX ORDER — ONDANSETRON 2 MG/ML
4 INJECTION INTRAMUSCULAR; INTRAVENOUS EVERY 30 MIN PRN
Status: CANCELLED | OUTPATIENT
Start: 2019-07-24

## 2019-07-24 RX ORDER — EPHEDRINE SULFATE 50 MG/ML
INJECTION, SOLUTION INTRAVENOUS PRN
Status: DISCONTINUED | OUTPATIENT
Start: 2019-07-24 | End: 2019-07-24

## 2019-07-24 RX ORDER — METOPROLOL TARTRATE 1 MG/ML
1-2 INJECTION, SOLUTION INTRAVENOUS EVERY 5 MIN PRN
Status: CANCELLED | OUTPATIENT
Start: 2019-07-24

## 2019-07-24 RX ADMIN — CEFAZOLIN SODIUM 2 G: 2 INJECTION, SOLUTION INTRAVENOUS at 11:54

## 2019-07-24 RX ADMIN — PROPOFOL 150 MG: 10 INJECTION, EMULSION INTRAVENOUS at 12:00

## 2019-07-24 RX ADMIN — SODIUM CHLORIDE, POTASSIUM CHLORIDE, SODIUM LACTATE AND CALCIUM CHLORIDE: 600; 310; 30; 20 INJECTION, SOLUTION INTRAVENOUS at 01:51

## 2019-07-24 RX ADMIN — DEXAMETHASONE SODIUM PHOSPHATE 8 MG: 4 INJECTION, SOLUTION INTRA-ARTICULAR; INTRALESIONAL; INTRAMUSCULAR; INTRAVENOUS; SOFT TISSUE at 12:00

## 2019-07-24 RX ADMIN — FENTANYL CITRATE 50 MCG: 50 INJECTION, SOLUTION INTRAMUSCULAR; INTRAVENOUS at 12:00

## 2019-07-24 RX ADMIN — HYDROMORPHONE HYDROCHLORIDE 0.5 MG: 1 INJECTION, SOLUTION INTRAMUSCULAR; INTRAVENOUS; SUBCUTANEOUS at 01:51

## 2019-07-24 RX ADMIN — FENTANYL CITRATE 50 MCG: 50 INJECTION, SOLUTION INTRAMUSCULAR; INTRAVENOUS at 12:10

## 2019-07-24 RX ADMIN — LIDOCAINE HYDROCHLORIDE 30 MG: 10 INJECTION, SOLUTION INFILTRATION; PERINEURAL at 12:00

## 2019-07-24 RX ADMIN — SODIUM CHLORIDE 1000 ML: 9 INJECTION, SOLUTION INTRAVENOUS at 00:09

## 2019-07-24 RX ADMIN — HYDROMORPHONE HYDROCHLORIDE 0.5 MG: 1 INJECTION, SOLUTION INTRAMUSCULAR; INTRAVENOUS; SUBCUTANEOUS at 05:12

## 2019-07-24 RX ADMIN — MIDAZOLAM 2 MG: 1 INJECTION INTRAMUSCULAR; INTRAVENOUS at 11:54

## 2019-07-24 RX ADMIN — EPHEDRINE SULFATE 10 MG: 50 INJECTION, SOLUTION INTRAVENOUS at 12:06

## 2019-07-24 RX ADMIN — GLYCOPYRROLATE 0.2 MG: 0.2 INJECTION, SOLUTION INTRAMUSCULAR; INTRAVENOUS at 12:00

## 2019-07-24 RX ADMIN — ONDANSETRON 4 MG: 2 INJECTION INTRAMUSCULAR; INTRAVENOUS at 12:00

## 2019-07-24 RX ADMIN — HYDROMORPHONE HYDROCHLORIDE 0.5 MG: 1 INJECTION, SOLUTION INTRAMUSCULAR; INTRAVENOUS; SUBCUTANEOUS at 08:05

## 2019-07-24 ASSESSMENT — MIFFLIN-ST. JEOR: SCORE: 1759.06

## 2019-07-24 NOTE — PLAN OF CARE
PRIMARY DIAGNOSIS: ACUTE RENAL COLIC    OUTPATIENT/OBSERVATION GOALS TO BE MET BEFORE DISCHARGE  1. Pain Status: Improved but still requiring IV narcotics.    2. Tolerating adequate PO diet: NPO     3. Surgical Intervention planned: To consult with urology     4. Cleared by consultants (if involved): Yes    5. Return to near baseline physical activity: Yes    Patient alert and oriented x4. Vitals are Temp: 96.7  F (35.9  C) Temp src: Oral BP: (!) 137/95 Pulse: 74 Heart Rate: 83 Resp: 18 SpO2: 99 % RA. Reports 7/10 pain in left flank that radiates to back; PRN dilaudid given. Denies nausea. LR infusing at 100 ml/hr. Patient up independently in room. Continuing to strain urine. Plan to assist with pain management and consult with urology.     Discharge Planner Nurse   Safe discharge environment identified: Yes  Barriers to discharge: Yes       Entered by: Ariadna Nguyen 07/24/2019      Please review provider order for any additional goals.   Nurse to notify provider when observation goals have been met and patient is ready for discharge.

## 2019-07-24 NOTE — ANESTHESIA PREPROCEDURE EVALUATION
Anesthesia Pre-Procedure Evaluation    Patient: Denver Goff   MRN: [id number] : 1980          Preoperative Diagnosis: Stone    Procedure(s):  CYSTOURETEROSCOPY, WITH HOLMIUM LASER LITHOTRIPSY OF URETERAL CALCULUS AND STENT INSERTION    History reviewed. No pertinent past medical history.  History reviewed. No pertinent surgical history.  Anesthesia Evaluation     .             ROS/MED HX    ENT/Pulmonary:  - neg pulmonary ROS     Neurologic:  - neg neurologic ROS     Cardiovascular: Comment: Double chambered right ventricle        METS/Exercise Tolerance:     Hematologic:  - neg hematologic  ROS       Musculoskeletal:  - neg musculoskeletal ROS       GI/Hepatic:  - neg GI/hepatic ROS       Renal/Genitourinary:  - ROS Renal section negative       Endo: Comment: .Body mass index is 30.4 kg/m .      (+) Obesity, .      Psychiatric:  - neg psychiatric ROS       Infectious Disease:  - neg infectious disease ROS       Malignancy:         Other: Comment: .Lab Test        19          WBC          9.2          8.9           HGB          14.8         14.8          MCV          89           89            PLT          250          284            Lab Test        19          NA           137          136          139           POTASSIUM    3.8          4.0          3.8           CHLORIDE     105          104          107           CO2          27           25           25            BUN          15           17           18            CR           1.27*        1.27*        1.79*         ANIONGAP     5            7            7             LUIGI          8.3*         8.9          8.8           GLC          102*         107*         104*                                   Physical Exam  Normal systems: cardiovascular and pulmonary    Airway   Mallampati: II    Dental  "    Cardiovascular   Rhythm and rate: regular and normal      Pulmonary    breath sounds clear to auscultation            Lab Results   Component Value Date    WBC 9.2 07/23/2019    HGB 14.8 07/23/2019    HCT 44.0 07/23/2019     07/23/2019     07/24/2019    POTASSIUM 3.8 07/24/2019    CHLORIDE 105 07/24/2019    CO2 27 07/24/2019    BUN 15 07/24/2019    CR 1.27 (H) 07/24/2019     (H) 07/24/2019    LUIGI 8.3 (L) 07/24/2019       Preop Vitals  BP Readings from Last 3 Encounters:   07/24/19 (!) 126/91   06/24/19 (!) 143/97    Pulse Readings from Last 3 Encounters:   07/24/19 73   06/24/19 84      Resp Readings from Last 3 Encounters:   07/24/19 16   06/24/19 18    SpO2 Readings from Last 3 Encounters:   07/24/19 100%   06/24/19 100%      Temp Readings from Last 1 Encounters:   07/24/19 96.8  F (36  C) (Temporal)    Ht Readings from Last 1 Encounters:   07/24/19 1.702 m (5' 7\")      Wt Readings from Last 1 Encounters:   07/24/19 88 kg (194 lb 1.6 oz)    Estimated body mass index is 30.4 kg/m  as calculated from the following:    Height as of this encounter: 1.702 m (5' 7\").    Weight as of this encounter: 88 kg (194 lb 1.6 oz).       Anesthesia Plan      History & Physical Review  History and physical reviewed and following examination; no interval change.    ASA Status:  2 .        Plan for General and LMA with Intravenous induction. Maintenance will be Inhalation and Balanced.    PONV prophylaxis:  Ondansetron (or other 5HT-3) and Dexamethasone or Solumedrol       Postoperative Care  Postoperative pain management:  Oral pain medications, Multi-modal analgesia and IV analgesics.      Consents  Anesthetic plan, risks, benefits and alternatives discussed with:  Patient or representative..                 Louis James DO                    .  "

## 2019-07-24 NOTE — DISCHARGE SUMMARY
"[facility]ation [facility] Discharge Summary          Denver Goff MRN# [id number]   Age: 38 year old YOB: 1980     Date of Admission:  7/23/2019  Date of Discharge::  7/24/2019  Admitting Physician:  Brian Patel MD  Discharge Physician:  Lilian Burk PA-C  Primary Physician: No Ref-Primary, Physician     Primary Discharge Diagnoses:   Distal Left Ureteral Stone with Mild Hydronephrosis     Hospital Course:   For detail history, please refer to H & P from 7/23/2019. \"In brief, this is a 38 year old male with history of renal stone disease, surgical repair of double chambered right ventricle, and hyperlipidemia.  He presented to the emergency department last night on 7/23/2019 for evaluation of left flank pain.  On Sarah 10 while in [place], [place] he was diagnosed with left ureter stone after presenting with similar symptoms.  He was treated conservatively in anticipation that the stone would pass spontaneously.  Symptoms resolved.  Shortly after that he moved to the [facility].  On 6/24/2019 he presented here with left-sided flank pain again.  He was diagnosed with ureter stone.  He was treated conservatively in anticipation of stone passing.  Symptoms resolved and he remained symptom-free until last night.  He began having flank pain again.  He came to the emergency department for evaluation.\"    ED work-up here showed stable vital signs. Creatinine was 1.27 with otherwise unremarkable Basic metabolic panel and CBC.  CT of abdomen and pelvis showed a 0.4 mm distal left ureter stone with mild hydronephrosis.     Patient was admitted to the observation unit.  He remained hemodynamically stable and afebrile on room air.  Urology was consulted and patient underwent a cystoscopy, left retrograde pyelogram, interpretation of fluoroscopic images, left ureteroscopy with stone basketing, placement of 5 x 26 double-J left ureteral stent.  Patient was discharged from the " "PACU and instructed to follow up with his PCP within one week for repeat bmp to ensure creatinine has normalized and Urology as instructed.    Procedures/Imaging:     Results for orders placed or performed during the hospital encounter of 07/23/19   CT Abdomen Pelvis w/o Contrast    Narrative    CT ABDOMEN PELVIS W/O CONTRAST  7/23/2019 11:41 PM     INDICATION: Left lower quadrant pain.    TECHNIQUE: Thin axial images through the abdomen and pelvis without  contrast. Coronal reformatted images. Radiation dose for this scan was  reduced using automated exposure control, adjustment of the mA and/or  kV according to patient size, or iterative reconstruction technique.    COMPARISON: 6/24/2019.    FINDINGS: Moderate left hydronephrosis due to a 0.4 cm UVJ stone. It  is unclear if this is the same stone which was seen previously. It may  be minimally larger. The degree of hydronephrosis has minimally  increased. No other urinary stones.    Liver, gallbladder, spleen, pancreas, adrenal glands and kidneys are  otherwise negative without contrast.    Pelvic structures are otherwise negative. No ascites.      Impression    IMPRESSION: Moderate left hydronephrosis due to a 0.4 cm UVJ stone.    PARVIZ CASTRO MD   XR Surgery JEANETTE Fluoro L/T 5 Min w Stills    Narrative    This exam was marked as non-reportable because it will not be read by a   radiologist or a [facility] non-radiologist provider.               Consultations:   Urology    Code Status:   Full    Allergies:   No Known Allergies     Subjective:   Patient reports pain is well controlled.  He denies nausea or emesis this morning.    Physical Exam:   Blood pressure 132/90, pulse 83, temperature 97.2  F (36.2  C), temperature source Temporal, resp. rate 20, height 1.702 m (5' 7\"), weight 88 kg (194 lb 1.6 oz), SpO2 95 %.  General: Alert, interactive, NAD, sitting up in bed, pleasant and cooperative.  HEENT: AT/NC, sclera anicteric, PERRL, EOMI, OP clear with " MMM  Neck: Supple, no JVD   Resp: clear to auscultation bilaterally, no crackles or wheezes  Cardiac: regular rate and rhythm, no murmur  Abdomen: Soft, mild left flank tenderness, nondistended. +BS.  No rebound or guarding.  Extremities: No LE edema  Skin: Warm and dry  Neuro: Alert & oriented x 3, moves all extremities equally   Discharge Medicatios:        Discharge Medication List as of 7/24/2019  1:23 PM      START taking these medications    Details   HYDROcodone-acetaminophen (NORCO) 5-325 MG tablet Take 1-2 tablets by mouth every 4 hours as needed for moderate to severe pain, Disp-5 tablet, R-0, Local Print      tamsulosin (FLOMAX) 0.4 MG capsule Take 1 capsule (0.4 mg) by mouth daily for 9 days, Disp-9 capsule, R-0, E-Prescribe             Instructions Given to Patient as Discharge:     Discharge Procedure Orders   Reason for your hospital stay   Order Comments: You were hospitalized due to kidney stones.  Urology was consulted for stone removal.     Follow-up and recommended labs and tests    Order Comments: Please follow up with PCP within one week.  Please follow up with Urology as instructed.     Activity   Order Comments: Your activity upon discharge: activity as tolerated     Order Specific Question Answer Comments   Is discharge order? Yes      When to contact your care team   Order Comments: Notify for fever >101.5; black or bloody stools; severe, persistent, or worsening nausea, vomiting, abdominal pain or distention, diarrhea, constipation; chest pain, difficulty breathing, swelling; dizziness, weakness, lightheadedness, fainting.  Proceed to the nearest emergency room for any urgent concerns.     Encourage fluids   Order Comments: Encourage fluids at home to keep urine clear to light pink     Diet Instructions   Order Comments: Resume pre procedure diet     Discharge Instructions   Order Comments: Patient to arrange for follow up appointment in 1-2 weeks     Diet   Order Comments: Follow this  diet upon discharge: advance diet as tolerated.     Order Specific Question Answer Comments   Is discharge order? Yes        Pending Tests at Discharge:   Surgical pathology    Discharge Disposition:   Discharged to home     Lilian Burk MS, PA-C  Hospitalist Service  Pager [phone]    >30 minutes was spent in discharge planning, care coordination, physical examination and medication reconciliation on the date of discharge, 7/24/2019

## 2019-07-24 NOTE — ED TRIAGE NOTES
Patient presents to ED due to L flank pain that developed AM     Previous hx kidney stones    Reports taking tylenol approx 1700 without relief

## 2019-07-24 NOTE — OR NURSING
Discharge instructions given and patient and sister verbalized understanding.  Questions answered.  Vital signs stable.  No complaints of pain.  Prescription medication given.  Discharged in stable condition via wheelchair to car.

## 2019-07-24 NOTE — PROGRESS NOTES
ROOM # 206-2    Living Situation (if not independent, order SW consult): Home independently   Facility name:  : [facility], [phone]    Activity level at baseline: Independent   Activity level on admit: Independent       Patient registered to observation; given Patient Bill of Rights; given the opportunity to ask questions about observation status and their plan of care.  Patient has been oriented to the observation room, bathroom and call light is in place.    Discussed discharge goals and expectations with patient/family.

## 2019-07-24 NOTE — DISCHARGE INSTRUCTIONS
GENERAL ANESTHESIA OR SEDATION ADULT DISCHARGE INSTRUCTIONS   SPECIAL PRECAUTIONS FOR 24 HOURS AFTER SURGERY    IT IS NOT UNUSUAL TO FEEL LIGHT-HEADED OR FAINT, UP TO 24 HOURS AFTER SURGERY OR WHILE TAKING PAIN MEDICATION.  IF YOU HAVE THESE SYMPTOMS; SIT FOR A FEW MINUTES BEFORE STANDING AND HAVE SOMEONE ASSIST YOU WHEN YOU GET UP TO WALK OR USE THE BATHROOM.    YOU SHOULD REST AND RELAX FOR THE NEXT 24 HOURS AND YOU MUST MAKE ARRANGEMENTS TO HAVE SOMEONE STAY WITH YOU FOR AT LEAST 24 HOURS AFTER YOUR DISCHARGE.  AVOID HAZARDOUS AND STRENUOUS ACTIVITIES.  DO NOT MAKE IMPORTANT DECISIONS FOR 24 HOURS.    DO NOT DRIVE ANY VEHICLE OR OPERATE MECHANICAL EQUIPMENT FOR 24 HOURS FOLLOWING THE END OF YOUR SURGERY.  EVEN THOUGH YOU MAY FEEL NORMAL, YOUR REACTIONS MAY BE AFFECTED BY THE MEDICATION YOU HAVE RECEIVED.    DO NOT DRINK ALCOHOLIC BEVERAGES FOR 24 HOURS FOLLOWING YOUR SURGERY.    DRINK CLEAR LIQUIDS (APPLE JUICE, GINGER ALE, 7-UP, BROTH, ETC.).  PROGRESS TO YOUR REGULAR DIET AS YOU FEEL ABLE.    YOU MAY HAVE A DRY MOUTH, A SORE THROAT, MUSCLES ACHES OR TROUBLE SLEEPING.  THESE SHOULD GO AWAY AFTER 24 HOURS.    CALL YOUR DOCTOR FOR ANY OF THE FOLLOWING:  SIGNS OF INFECTION (FEVER, GROWING TENDERNESS AT THE SURGERY SITE, A LARGE AMOUNT OF DRAINAGE OR BLEEDING, SEVERE PAIN, FOUL-SMELLING DRAINAGE, REDNESS OR SWELLING.    IT HAS BEEN OVER 8 TO 10 HOURS SINCE SURGERY AND YOU ARE STILL NOT ABLE TO URINATE (PASS WATER).     CYSTOSCOPY DISCHARGE INSTRUCTIONS  [facility]  [address]  [phone]    YOU MAY GO BACK TO YOUR NORMAL DIET AND ACTIVITY, UNLESS YOUR DOCTOR TELLS YOU NOT TO.    FOR THE NEXT TWO DAYS, YOU MAY NOTICE:    SOME BLOOD IN YOUR URINE.  SOME BURNING WHEN YOU URINATE.  AN URGE TO URINATE MORE OFTEN.  BLADDER SPASMS.    THESE ARE NORMAL AFTER THE PROCEDURE.  THEY SHOULD GO AWAY AFTER A DAY OR TWO.  TO RELIEVE THESE PROBLEMS:     DRINK 6 TO 8 LARGE GLASSES OF WATER EACH DAY  (INCLUDES DRINKS AT MEALS).  THIS WILL HELP CLEAR THE URINE.    TAKE WARM BATHS TO RELIEVE PAIN AND BLADDER SPASMS.  DO NOT ADD ANYTHING TO THE BATH WATER.    YOUR DOCTOR MAY PRESCRIBE PAIN MEDICINE.  YOU MAY ALSO TAKE TYLENOL (ACETAMINOPHEN) FOR PAIN.    CALL YOUR SURGEON IF YOU HAVE:    A FEVER OVER 101 DEGREES.  CHECK YOUR TEMPERATURE UNDER YOUR TONGUE.    CHILLS.    FAILURE TO URINATE (NO URINE COMES OUT WHEN YOU TRY TO USE THE TOILET).  TRY SOAKING IN A BATHTUB FULL OF WARM WATER.  IF STILL NO URINE, CALL YOUR DOCTOR.    A LOT OF BLOOD IN THE URINE, OR BLOOD CLOTS LARGER THAN A NICKEL.      PAIN IN THE BACK OR BELLY AREA (ABDOMEN).    PAIN OR SPASMS THAT ARE NOT RELIEVED BY WARM TUB BATHS AND PAIN MEDICINE.      SEVERE PAIN, BURNING OR OTHER PROBLEMS WHILE PASSING URINE.    PAIN THAT GETS WORSE AFTER TWO DAYS.        STENT INFORMATION/DISCHARGE INSTRUCTIONS  [facility]OLOG[facility]  [address]  [phone]    During surgery, a stent may be placed in the ureter.  The ureter is the tube that drains urine from the kidney to the bladder.  The stent is placed to dilate (open) the ureter so stone fragments can pass easily through the ureter or to decrease ureteral swelling after surgery or to relieve an obstruction.      The stent is made of silicone.  The upper end of the stent curls in the kidney while the lower end rests in the bladder.    While the stent is in place you may experience the following symptoms:  Blood and/or small blood clots in the urine  Bladder spasms (frequency and urgency of urination)  Discomfort or aching in the back or side where the stent is  Burning or discomfort at the end of urine stream    To decrease these symptoms you should:  Take antispasmodic medication as prescribed (Detrol, Ditropan, etc.)  Drink plenty of fluids but avoid caffeine and citrus (include cranberry)  If you are having discomfort in back or side, decrease activity    Please call your physician or  the physician on call if you experience:  Fever greater than 101 degrees  Severe pain not relieved by pain medication or rest    Please make an appointment for the removal of the stent according to your physician's instructions.

## 2019-07-24 NOTE — OP NOTE
Procedure Date: 07/24/2019      SURGEON:  Gavino Delarosa MD      PREOPERATIVE DIAGNOSIS:  Left ureteral stone.      POSTOPERATIVE DIAGNOSIS:  Left ureteral stone.      PROCEDURE PERFORMED:  Cystoscopy, left retrograde pyelogram, interpretation of fluoroscopic images, left ureteroscopy with stone basketing, placement of 5 x 26 double-J left ureteral stent.      ANESTHESIA:  General.      COMPLICATIONS:  None.      INDICATIONS FOR PROCEDURE:  Denver Goff is a 38-year-old gentleman with a left distal ureteral stone who now presents for extraction.      DETAILS OF THE PROCEDURE:  The risks and benefits of the procedure were explained in detail to the patient and informed consent was obtained.  The patient was brought to the operating room, placed supine on the table, where he underwent general endotracheal anesthetic.  He was then moved down to the dorsal lithotomy position where he was prepped and draped in a standard sterile fashion.  The procedure began by introducing the 22 Spanish rigid cystoscope through the urethra into the bladder to perform cystoscopy.  There were no urothelial abnormalities identified.  I cannulated the left ureteral orifice with ureteral catheter and performed retrograde pyelogram.  This found hydronephrosis, hydroureter down to the level of the distal ureter.  I passed a Glidewire into the left kidney and backed off the ureteral catheter along with the scope.  Alongside the Glidewire, I passed the rigid ureteroscope through the urethra into the bladder and up the left ureter.  The stone was seen in the very distal left ureter.  I performed ureteroscopy all the way up to the kidney and no other stones were identified.  I went back to the distal ureter and then basketed out the stone intact.  I then reloaded the cystoscope over the Glidewire until the left ureteral orifice was visualized.  I passed a 5 x 26 double-J ureteral stent over the wire.  The wire was pulled back and a good curl  could be seen in the renal pelvis under fluoroscopy.  A good curl was seen in the bladder under direct visualization.  The bladder was drained and the scope was removed.  I placed a B and O suppository at the end the case.      The patient tolerated the procedure without complications.  He went to the post-anesthetic care in good condition.  He will go home from there.  I will see him back next week in the office for stent removal.         MELINA PARRA MD             D: 2019   T: 2019   MT: ERICA      Name:     WAN BLAS   MRN:      [id number]        Account:        [id number]   :      1980           Procedure Date: 2019      Document: [id number]

## 2019-07-24 NOTE — ED NOTES
[facility]  ED Nurse Handoff Report    Denver Goff is a 38 year old male   ED Chief complaint: No chief complaint on file.  . ED Diagnosis:   Final diagnoses:   None     Allergies: No Known Allergies    Code Status: Full Code  Activity level - Baseline/Home:  Independent. Activity Level - Current:   Independent. Lift room needed: No. Bariatric: No   Needed: No   Isolation: No. Infection: Not Applicable.     Vital Signs:   Vitals:    07/23/19 2047 07/23/19 2300   BP: 128/86 123/85   Pulse:  82   Resp: 18    Temp: 98.2  F (36.8  C)    TempSrc: Oral    SpO2: 97%        Cardiac Rhythm:  ,      Pain level: 0-10 Pain Scale: 8  Patient confused: No. Patient Falls Risk: Yes.   Elimination Status: Has voided   Patient Report - Initial Complaint: LLQ abdominal pain and left flank pain. Focused Assessment: Pt presents with continued LLQ abdominal pain and flank pain that is worse today. Pt had similar pain 2 months prior, went to see ED while at different state, known kidney stone. Came back for continued sx a month ago here in ED, sent home with the same kidney stone dx. Came back today for continued sx, has not passed stone and worsened pain and nausea. ABCs intact. A&Ox3.   Tests Performed: labs, CT scan. Abnormal Results:   Labs Ordered and Resulted from Time of ED Arrival Up to the Time of Departure from the ED   ROUTINE UA WITH MICROSCOPIC - Abnormal; Notable for the following components:       Result Value    Specific Gravity Urine >1.035 (*)     Blood Urine Moderate (*)     Protein Albumin Urine 20 (*)     RBC Urine 19 (*)     Mucous Urine Present (*)     All other components within normal limits   BASIC METABOLIC PANEL - Abnormal; Notable for the following components:    Glucose 107 (*)     Creatinine 1.27 (*)     All other components within normal limits   CBC WITH PLATELETS DIFFERENTIAL     CT Abdomen Pelvis w/o Contrast   Final Result   IMPRESSION: Moderate left hydronephrosis due  to a 0.4 cm UVJ stone.      PARVIZ CASTRO MD        .   Treatments provided: IV dilaudid, IV fluids, IV zofran  Family Comments: NA  OBS brochure/video discussed/provided to patient:  Yes  ED Medications:   Medications   0.9% sodium chloride BOLUS (1,000 mLs Intravenous New Bag 7/24/19 0009)   ondansetron (ZOFRAN) injection 4 mg (4 mg Intravenous Given 7/23/19 2255)   HYDROmorphone (PF) (DILAUDID) injection 0.5 mg (0.5 mg Intravenous Given 7/23/19 2255)     Drips infusing:  Yes  For the majority of the shift, the patient's behavior Green. Interventions performed were NA.     Severe Sepsis OR Septic Shock Diagnosis Present: No      ED Nurse Name/Phone Number: Jeanette Browning,   12:24 AM    RECEIVING UNIT ED HANDOFF REVIEW    Above ED Nurse Handoff Report was reviewed: Yes  Reviewed by: Ariadna Nguyen on July 24, 2019 at 12:41 AM

## 2019-07-24 NOTE — H&P
[facility]  History and Physical   Hospitalist Service    Brian Patel MD    Denver Goff MRN# [id number]   YOB: 1980 Age: 38 year old      Date of Admission:  7/23/2019           Assessment and Plan:   Denver Goff is a 38-year-old male with history of renal stone disease, surgical repair of double chambered right ventricle, and hyperlipidemia.  He presented to the emergency department last night on 7/23/2019 for evaluation of left flank pain.  On Sarah 10 while in [place], [place] he was diagnosed with left ureter stone after presenting with similar symptoms.  He was treated conservatively in anticipation that the stone would pass spontaneously.  Symptoms resolved.  Shortly after that he moved to the [place].  On 6/24/2019 he presented here with left-sided flank pain again.  He was diagnosed with ureter stone.  He was treated conservatively in anticipation of stone passing.  Symptoms resolved and he remained symptom-free until last night.  He began having flank pain again.  He came to the emergency department for evaluation.  Work-up here showed stable vital signs.  Basic metabolic panel and CBC were unremarkable.  CT of abdomen and pelvis showed a 0.4 mm distal left ureter stone with mild hydronephrosis.  Denver is feeling better after treatment in the emergency department.  I was asked to admit him patient to observation for urology evaluation of ureter stone that has failed 6 weeks of conservative therapy and now has associated mild hydronephrosis.    Problem list:    1.  Distal left ureter stone with mild hydronephrosis.  This is been present for at least 6 weeks.  It seems to have failed conservative treatment.  Admit to observation.  Keep n.p.o.  Isauro G6 and antiemetics will be available for use as needed.  Hydrate with lactated Ringer's.  Strain urine.  Consult urology.    Full code  Ambulate for DVT prophylaxis  Disposition: Admit to observation            Code Status:   Full Code         Primary Care Physician:   No Ref-Primary, Physician None         Chief Complaint:   Left flank pain    History is obtained from patient, Dr. Cox, and the medical record         History of Present Illness:   Denver Goff is a 38-year-old male with history of renal stone disease, surgical repair of double chambered right ventricle, and hyperlipidemia.  He presented to the emergency department last night on 7/23/2019 for evaluation of left flank pain.  On Sarah 10 while in [place], [place] he was diagnosed with left ureter stone after presenting with similar symptoms.  He was treated conservatively in anticipation that the stone would pass spontaneously.  Symptoms resolved.  Shortly after that he moved to the [facility].  On 6/24/2019 he presented here with left-sided flank pain again.  He was diagnosed with ureter stone.  He was treated conservatively in anticipation of stone passing.  Symptoms resolved and he remained symptom-free until last night.  He began having flank pain again.  He came to the emergency department for evaluation.  Work-up here showed stable vital signs.  Basic metabolic panel and CBC were unremarkable.  CT of abdomen and pelvis showed a 0.4 mm distal left ureter stone with mild hydronephrosis.  Denver is feeling better after treatment in the emergency department.  I was asked to admit him patient to observation for urology evaluation of ureter stone that has failed 6 weeks of conservative therapy and now has associated mild hydronephrosis.           Past Medical History:     Patient Active Problem List   Diagnosis     Renal colic   double chambered right ventricle (congenital)  Dyslipidemia          Past Surgical History:   Surgical correction of double chambered right ventricle         Home Medications:   Recent Flomax and Vicodin         Allergies:   No Known Allergies         Social History:   Single  Former [place] smoker  Does not use  alcohol          Family History:   Dyslipidemia  Lung cancer  Hypertension           Review of Systems:   The 10 point Review of Systems is negative other than as noted in the HPI.           Physical Exam:   Blood pressure 123/85, pulse 82, temperature 98.2  F (36.8  C), temperature source Oral, resp. rate 18, SpO2 97 %.  0 lbs 0 oz      GENERAL: Pleasant and cooperative. No acute distress.  EYES: Pupils equal and round. No scleral erythema or icterus.  ENT: External ears are normal without deformity. Posterior oropharynx is without erythem, swelling, or exudate.  NECK: Supple. No masses or swelling. No tenderness. Thyroid is normal without mass or tenderness.  CHEST: Clear to auscultation. Normal breath sounds. No retractions.   CV: Regular rate and rhythm. No JVD. Pulses normal.  ABDOMEN: Bowel sounds present. No tenderness. No masses or hernia.  EXTREMETIES: No clubbing, cyanosis, or ischemia.  SKIN: Warm and dry to touch. No wounds or rashes.  NEUROLOGIC: Strength and sensation are normal. Deep tendon reflexes are normal. Cranial nerves are normal.              Data:   All new lab and imaging data was reviewed.     Results for orders placed or performed during the hospital encounter of 07/23/19 (from the past 24 hour(s))   Routine UA with microscopic   Result Value Ref Range    Color Urine Yellow     Appearance Urine Clear     Glucose Urine Negative NEG^Negative mg/dL    Bilirubin Urine Negative NEG^Negative    Ketones Urine Negative NEG^Negative mg/dL    Specific Gravity Urine >1.035 (H) 1.003 - 1.035    Blood Urine Moderate (A) NEG^Negative    pH Urine 5.0 5.0 - 7.0 pH    Protein Albumin Urine 20 (A) NEG^Negative mg/dL    Urobilinogen mg/dL Normal 0.0 - 2.0 mg/dL    Nitrite Urine Negative NEG^Negative    Leukocyte Esterase Urine Negative NEG^Negative    Source Midstream Urine     WBC Urine 4 0 - 5 /HPF    RBC Urine 19 (H) 0 - 2 /HPF    Squamous Epithelial /HPF Urine <1 0 - 1 /HPF    Transitional Epi 1 0 - 1  /HPF    Mucous Urine Present (A) NEG^Negative /LPF   CBC with platelets differential   Result Value Ref Range    WBC 9.2 4.0 - 11.0 10e9/L    RBC Count 4.94 4.4 - 5.9 10e12/L    Hemoglobin 14.8 13.3 - 17.7 g/dL    Hematocrit 44.0 40.0 - 53.0 %    MCV 89 78 - 100 fl    MCH 30.0 26.5 - 33.0 pg    MCHC 33.6 31.5 - 36.5 g/dL    RDW 13.3 10.0 - 15.0 %    Platelet Count 250 150 - 450 10e9/L    Diff Method Automated Method     % Neutrophils 78.6 %    % Lymphocytes 12.2 %    % Monocytes 8.4 %    % Eosinophils 0.3 %    % Basophils 0.2 %    % Immature Granulocytes 0.3 %    Nucleated RBCs 0 0 /100    Absolute Neutrophil 7.2 1.6 - 8.3 10e9/L    Absolute Lymphocytes 1.1 0.8 - 5.3 10e9/L    Absolute Monocytes 0.8 0.0 - 1.3 10e9/L    Absolute Eosinophils 0.0 0.0 - 0.7 10e9/L    Absolute Basophils 0.0 0.0 - 0.2 10e9/L    Abs Immature Granulocytes 0.0 0 - 0.4 10e9/L    Absolute Nucleated RBC 0.0    Basic metabolic panel   Result Value Ref Range    Sodium 136 133 - 144 mmol/L    Potassium 4.0 3.4 - 5.3 mmol/L    Chloride 104 94 - 109 mmol/L    Carbon Dioxide 25 20 - 32 mmol/L    Anion Gap 7 3 - 14 mmol/L    Glucose 107 (H) 70 - 99 mg/dL    Urea Nitrogen 17 7 - 30 mg/dL    Creatinine 1.27 (H) 0.66 - 1.25 mg/dL    GFR Estimate 71 >60 mL/min/[1.73_m2]    GFR Estimate If Black 82 >60 mL/min/[1.73_m2]    Calcium 8.9 8.5 - 10.1 mg/dL   CT Abdomen Pelvis w/o Contrast    Narrative    CT ABDOMEN PELVIS W/O CONTRAST  7/23/2019 11:41 PM     INDICATION: Left lower quadrant pain.    TECHNIQUE: Thin axial images through the abdomen and pelvis without  contrast. Coronal reformatted images. Radiation dose for this scan was  reduced using automated exposure control, adjustment of the mA and/or  kV according to patient size, or iterative reconstruction technique.    COMPARISON: 6/24/2019.    FINDINGS: Moderate left hydronephrosis due to a 0.4 cm UVJ stone. It  is unclear if this is the same stone which was seen previously. It may  be minimally larger.  The degree of hydronephrosis has minimally  increased. No other urinary stones.    Liver, gallbladder, spleen, pancreas, adrenal glands and kidneys are  otherwise negative without contrast.    Pelvic structures are otherwise negative. No ascites.      Impression    IMPRESSION: Moderate left hydronephrosis due to a 0.4 cm UVJ stone.    PARVIZ CASTRO MD

## 2019-07-24 NOTE — PLAN OF CARE
PRIMARY DIAGNOSIS: ACUTE RENAL COLIC    OUTPATIENT/OBSERVATION GOALS TO BE MET BEFORE DISCHARGE  1. Pain Status: Improved but still requiring IV narcotics.    2. Tolerating adequate PO diet: No- NPO    3. Surgical Intervention planned: Yes on OR for 11 am    4. Cleared by consultants (if involved): No    5. Return to near baseline physical activity: Yes    Discharge Planner Nurse   Safe discharge environment identified: Yes  Barriers to discharge: No       Entered by: Savannah Berrios 07/24/2019 10:14 AM     Please review provider order for any additional goals.   Nurse to notify provider when observation goals have been met and patient is ready for discharge.    Patient is alert and oriented. Patient complains of L flank pain and was given IV dilaudid which is effective, patient denies nausea. Patient has 0.4cm stone in L UVJ per CT . Plan is IVF, Pain control, and urology consult to go to the OR. VSS. Will continue to monitor.

## 2019-07-24 NOTE — ANESTHESIA CARE TRANSFER NOTE
Patient: Denver Goff    Procedure(s):  CYSTOURETEROSCOPY, LITHOTRIPSY OF LEFT URETERAL CALCULUS AND STENT INSERTION    Diagnosis: Stone  Diagnosis Additional Information: No value filed.    Anesthesia Type:   General, LMA     Note:  Airway :LMA  Patient transferred to:PACU  Handoff Report: Identifed the Patient, Identified the Reponsible Provider, Reviewed the pertinent medical history, Discussed the surgical course, Reviewed Intra-OP anesthesia mangement and issues during anesthesia, Set expectations for post-procedure period and Allowed opportunity for questions and acknowledgement of understanding      Vitals: (Last set prior to Anesthesia Care Transfer)    CRNA VITALS  7/24/2019 1157 - 7/24/2019 1233      7/24/2019             Pulse:  81    SpO2:  100 %    Resp Rate (observed):  5  (Abnormal)                 Electronically Signed By: DEVYN Phillips CRNA  July 24, 2019  12:33 PM

## 2019-07-24 NOTE — ED PROVIDER NOTES
History     Chief Complaint:  Flank pain       HPI   Denver Goff is a 38 year old male with a history of kidney stones, double chambered right ventricle, hyperlipidemia, and obesity, who presents with left sided flank pain that began 1 day ago, prompting the patient to visit the ED. The patient describes that his pain radiates from his left flank to his lower abdomen.The patient reports vomiting twice today and has been nauseous, though is not currently nauseous while here.  He endorses that movement makes his pain worse. He also notes some dysuria, but denies hematuria. He also reports 1 episode of melena today and denies a previous history of this in the past. He states that he took Tylenol 6 hours ago with no relief of his symptoms. He is unsure whether he has had a fever. He denies any penile discharge, penile pain, or scrotal pain. To note, the patient was recently diagnosed with a left sided kidney stone 6 weeks ago and was seen in the ED on 6/28/19 where he was found to have renal insufficiency. He denies passing this stone over the past 6 weeks.     Allergies:  NDKA     Medications:    Flomax     Past Medical History:    Renal calculus  Obesity  Double chambered right ventricle   Hyperlipidemia  Obesity  Kidney stones    Past Surgical History:    History reviewed. No pertinent surgical history.    Family History:    Hypercholesteremia  Lung cancer  HTN    Social History:  Smoking status: former  Alcohol use: no   PCP: Physician No Ref-Primary  Marital Status:  Single      Review of Systems   Gastrointestinal: Positive for nausea and vomiting.        Positive for melena.   Genitourinary: Positive for dysuria and flank pain. Negative for discharge, hematuria, penile pain, testicular pain and urgency.   All other systems reviewed and are negative.        Physical Exam     Patient Vitals for the past 24 hrs:   BP Temp Temp src Heart Rate Resp SpO2   07/23/19 2047 128/86 98.2  F (36.8  C) Oral 78 18  97 %        Physical Exam    HEENT:  mmm  Neck: supple  CV: ppi, regular   Resp: speaking in full sentences with any resp distress  Abd: mild tenderness in the LLQ area and L CVA  Ext: peripheral edema present:  No  Skin: warm dry well perfused  Neuro: Alert, no gross motor or sensory deficits,  gait stable        Emergency Department Course     Imaging:  Radiographic findings were communicated with the patient who voiced understanding of the findings.    CT Abdomen Pelvis w/o Contrast  Moderate left hydronephrosis due to a 0.4 cm UVJ stone.  As read by Radiology.    Laboratory:  BMP: Glucose 107 (H), creatinine 1.27 (H), o/w WNL  CBC: WBC 9.2, HGB 14.8,      UA: specific gravity >1.035 (H), blood moderate, albumin 20, RBC 19 (H), mucous present, o/w negative    Interventions:  2255: Zofran 4 mg IV  2255: Dilaudid 0.5 mg IV  0009: NS 1L IV Bolus     Emergency Department Course:  : Nursing notes and vitals reviewed. I performed an exam of the patient as documented above.     IV inserted. Medicine administered as documented above. Blood drawn. This was sent to the lab for further testing, results above. The patient provided a urine sample here in the emergency department. This was sent for laboratory testing, findings above.     The patient was sent for an abdomen pelvis CT while in the emergency department, findings above.     0000: I rechecked the patient and discussed the results of his workup thus far.     I consulted with the hospitalist services. They are in agreement to accept the patient for admission, further evaluation, and treatment.     Findings and plan explained to the Patient who consents to admission..    Impression & Plan      Medical Decision Makin-year-old male who with left lower quadrant pain seen last month for distal ureteral stone had a period of being symptom-free now returning with left lower quadrant pain radiating towards the left lower back found to have persistent  hydronephrosis and distal left ureteral stone given that there was no other nephrolithiasis seen on his previous scan this is likely the same stone which is has not passed spontaneously.  Given he is gone at least a month without passage of this relatively small stone will recommend symptom control overnight urologic consultation in the morning for definitive management.  No associated acute renal failure or infection.    Critical Care time:  none    Diagnosis:    ICD-10-CM    1. Kidney stone N20.0        Disposition:  Admitted to the hospital.     Sherry MILLER, am serving as a scribe at 10:45 PM on 7/23/2019 to document services personally performed by Jj Cox MD based on my observations and the provider's statements to me.       Sherry Mcallister  7/23/2019   [facility] EMERGENCY DEPARTMENT       Jj Cox MD  07/24/19 0107

## 2019-07-24 NOTE — ANESTHESIA POSTPROCEDURE EVALUATION
Patient: Denver Goff    Procedure(s):  CYSTOURETEROSCOPY, LITHOTRIPSY OF LEFT URETERAL CALCULUS AND STENT INSERTION    Diagnosis:Stone  Diagnosis Additional Information: No value filed.    Anesthesia Type:  General, LMA    Note:  Anesthesia Post Evaluation    Patient location during evaluation: PACU  Patient participation: Able to fully participate in evaluation  Level of consciousness: awake  Pain management: adequate  Airway patency: patent  Cardiovascular status: acceptable  Respiratory status: acceptable  Hydration status: acceptable  PONV: controlled     Anesthetic complications: None          Last vitals:  Vitals:    07/24/19 1310 07/24/19 1324 07/24/19 1400   BP:  120/79 132/90   Pulse:      Resp: 30  20   Temp:  97.1  F (36.2  C) 97.2  F (36.2  C)   SpO2: 99% 97% 95%         Electronically Signed By: Louis James DO  July 24, 2019  2:05 PM

## 2019-07-24 NOTE — CONSULTS
Consult Date:  07/24/2019      REASON FOR CONSULTATION:  Left ureteral stone.      HISTORY OF PRESENT ILLNESS:  Denver Goff is a 38-year-old gentleman who was initially diagnosed with a left-sided ureteral stone when he was visiting family in [place], [place] on 06/09.  He was sent home with an antibiotic as well as Flomax.  However, he did not think he actually had an infection at that time.  He was then in our emergency department here on 06/24 with complaints of continued pain on the left side.  He had a CT scan performed that showed a 2.5 mm stone in the distal left ureter.  He was sent home again with Flomax in hopes that he would pass the stone and overall did well, but then recently had recurrence of his left-sided flank pain, so he came back to the Emergency Department again yesterday evening.  He had another CT scan and again the same 3 mm stone was seen in the distal left ureter.  He was admitted for pain control.  His pain has continued since being admitted overnight.  He has had no fevers, chills, nausea, or vomiting.  He has no prior stone history.      PAST MEDICAL HISTORY:  He has no chronic medical problems.  He was diagnosed with a double chamber right ventricle.      PAST SURGICAL HISTORY:  None.      HOME MEDICATIONS:  Flomax only.      ALLERGIES:  NO KNOWN DRUG ALLERGIES.      SOCIAL HISTORY:  He is a former smoker.      FAMILY HISTORY:  No family history of kidney stones.      REVIEW OF SYSTEMS:  He did have some nausea and vomiting earlier that has now resolved.  He has had no fevers or chills.  Left flank pain continues.      PHYSICAL EXAMINATION:   VITAL SIGNS:  He is currently afebrile.  Vital signs stable.   GENERAL:  Currently alert and oriented, in no acute distress.   HEENT:  Normocephalic and atraumatic.   RESPIRATORY:  Normal nonlabored breathing.   ABDOMEN:  Soft, nontender, nondistended.      IMAGING STUDIES:  I reviewed his CT scan images from today as well as from 06/24.  In  both studies, he has a small distal left ureteral stone.  The stone appears to have made minimal progress since .      IMPRESSION AND PLAN:  This is a 38-year-old gentleman with a distal left ureteral stone.  We discussed his treatment options of trial of passage versus treating the stone.  We kept him on the fact that the stone was diagnosed over 7 weeks ago and he has been trying to pass the stone the entire time.  Given the lengthy period of trial of passage, he wishes to treat the stone today.  I recommended cystoscopy with left-sided ureteroscopy, holmium laser lithotripsy, stone basketing and stent placement.  I discussed the procedure in detail with him along with its risks and expected recovery.  He wishes to proceed.  We will proceed to the operating room later today.  The nurses will strain his urine on the floor in the interim to make certain he does not pass the stone.         MELINA PARRA MD             D: 2019   T: 2019   MT: ALMA      Name:     WAN BLAS   MRN:      [id number]        Account:       [id number]   :      1980           Consult Date:  2019      Document: [id number]

## 2019-07-27 LAB
APPEARANCE STONE: NORMAL
COMPN STONE: NORMAL
NUMBER STONE: 1
SIZE STONE: NORMAL MM
WT STONE: 16 MG

## 2019-08-01 ENCOUNTER — OFFICE VISIT (OUTPATIENT)
Dept: UROLOGY | Facility: CLINIC | Age: 39
End: 2019-08-01
Payer: MEDICAID

## 2019-08-01 VITALS
WEIGHT: 185 LBS | BODY MASS INDEX: 29.03 KG/M2 | SYSTOLIC BLOOD PRESSURE: 110 MMHG | HEIGHT: 67 IN | HEART RATE: 82 BPM | OXYGEN SATURATION: 97 % | DIASTOLIC BLOOD PRESSURE: 76 MMHG

## 2019-08-01 DIAGNOSIS — N20.0 NEPHROLITHIASIS: ICD-10-CM

## 2019-08-01 DIAGNOSIS — Z79.2 PROPHYLACTIC ANTIBIOTIC: Primary | ICD-10-CM

## 2019-08-01 PROCEDURE — 99213 OFFICE O/P EST LOW 20 MIN: CPT | Mod: 25 | Performed by: UROLOGY

## 2019-08-01 PROCEDURE — 52310 CYSTOSCOPY AND TREATMENT: CPT | Performed by: UROLOGY

## 2019-08-01 RX ORDER — CIPROFLOXACIN 500 MG/1
500 TABLET, FILM COATED ORAL ONCE
Qty: 1 TABLET | Refills: 0 | Status: SHIPPED | OUTPATIENT
Start: 2019-08-01 | End: 2019-09-05

## 2019-08-01 RX ORDER — LIDOCAINE HYDROCHLORIDE 20 MG/ML
JELLY TOPICAL ONCE
Status: ACTIVE | OUTPATIENT
Start: 2019-08-01

## 2019-08-01 ASSESSMENT — PAIN SCALES - GENERAL: PAINLEVEL: NO PAIN (0)

## 2019-08-01 ASSESSMENT — MIFFLIN-ST. JEOR: SCORE: 1717.78

## 2019-08-01 NOTE — PATIENT INSTRUCTIONS

## 2019-08-01 NOTE — LETTER
Date:August 2, 2019      Patient was self referred, no letter generated. Do not send.        [facility] Information

## 2019-08-01 NOTE — NURSING NOTE
Chief Complaint   Patient presents with     Cystoscopy     Pt here for stent removal     Prior to the start of the procedure and with procedural staff participation, I verbally confirmed the patient s identity using two indicators, relevant allergies, that the procedure was appropriate and matched the consent or emergent situation, and that the correct equipment/implants were available. Immediately prior to starting the procedure I conducted the Time Out with the procedural staff and re-confirmed the patient s name, procedure, and site/side. I have wiped the patient off with the povidone-Iodine solution, draped them,  used Lidocaine hydrochloride jelly, and instilled sterile water into the bladder. (The Joint Commission universal protocol was followed.)  Yes    Sedation (Moderate or Deep): None  5mL 2% lidocaine hydrochloride Urojet instilled into urethra.    NDC# [address]-79  Lot #: [id number]  Expiration Date:  12/20    Rossy Marrero

## 2019-08-01 NOTE — LETTER
"8/1/2019       RE: Denver Goff  [address]  [facility] [address]     Dear Colleague,    Thank you for referring your patient, Denver Goff, to the [facility]. Please see a copy of my visit note below.    Office Visit Note  [facility]  [phone]    UROLOGIC DIAGNOSES:   Left ureteral stone    CURRENT INTERVENTIONS:   S/P Extraction    HISTORY:   This is a 38-year-old gentleman who is in the hospital last week with a 3 mm distal left ureteral stone that had been in place since early June. He underwent stone extraction. Stone was composed of calcium oxalate and calcium phosphate. Serum calcium levels are normal. He has felt well since his procedure and is here today for stent removal.      PAST MEDICAL HISTORY: History reviewed. No pertinent past medical history.    PAST SURGICAL HISTORY:   Past Surgical History:   Procedure Laterality Date     CYSTOSCOPY, RETROGRADES, EXTRACT STONE, INSERT STENT, COMBINED Left 7/24/2019    Procedure: Cystoscopy, left retrograde pyelogram, interpretation of fluoroscopic images, left ureteroscopy with stone basketing, placement of 5 x 26 double-J left ureteral stent, laser on stand-by;  Surgeon: Gavino Delarosa MD;  Location:  OR       FAMILY HISTORY: History reviewed. No pertinent family history.    SOCIAL HISTORY:   Social History     Tobacco Use     Smoking status: Former Smoker     Types: Hookah     Smokeless tobacco: Never Used   Substance Use Topics     Alcohol use: Not Currently       Current Outpatient Medications   Medication     ciprofloxacin (CIPRO) 500 MG tablet     tamsulosin (FLOMAX) 0.4 MG capsule     HYDROcodone-acetaminophen (NORCO) 5-325 MG tablet     No current facility-administered medications for this visit.          PHYSICAL EXAM:    /76   Pulse 82   Ht 1.702 m (5' 7\")   Wt 83.9 kg (185 lb)   SpO2 97%  "  BMI 28.98 kg/m       Constitutional: Well developed. Conversant and in no acute distress  Eyes: Anicteric sclera, conjunctiva clear, normal extraocular movements  ENT: Normocephalic and atraumatic,   Skin: Warm and dry. No rashes or lesions  Cardiac: No peripheral edema  Back/Flank: Not done  CNS/PNS: Normal musculature and movements, moves all extremities normally  Respiratory: Normal non-labored breathing  Abdomen: Soft nontender and nondistended  Peripheral Vascular: No peripheral edema  Mental Status/Psych: Alert and Oriented x 3. Normal mood and affect    Penis: Not done  Scrotal Skin: Not done  Testicles: Not done  Epididymis: Not done  Digital Rectal Exam:     Cystoscopy: I performed flexible cystoscopy today and the stent was removed without difficulty    Imaging: None    Urinalysis: UA RESULTS:  Recent Labs   Lab Test 07/23/19 2053   COLOR Yellow   APPEARANCE Clear   URINEGLC Negative   URINEBILI Negative   URINEKETONE Negative   SG >1.035*   UBLD Moderate*   URINEPH 5.0   PROTEIN 20*   NITRITE Negative   LEUKEST Negative   RBCU 19*   WBCU 4       PSA:     Post Void Residual:     Other labs: None today      IMPRESSION:  Doing well, stent removed    PLAN:  We discussed prevention methods for future stones.He will follow-up with me as needed in the future.    Total Time: 15 minutes                                      Total in Consultation: 10 minutes      Gavino Delarosa M.D.              Again, thank you for allowing me to participate in the care of your patient.      Sincerely,    Gavino Delarosa MD

## 2019-08-01 NOTE — PROGRESS NOTES
"Office Visit Note  [facility]  [phone]    UROLOGIC DIAGNOSES:   Left ureteral stone    CURRENT INTERVENTIONS:   S/P Extraction    HISTORY:   This is a 38-year-old gentleman who is in the hospital last week with a 3 mm distal left ureteral stone that had been in place since early June. He underwent stone extraction. Stone was composed of calcium oxalate and calcium phosphate. Serum calcium levels are normal. He has felt well since his procedure and is here today for stent removal.      PAST MEDICAL HISTORY: History reviewed. No pertinent past medical history.    PAST SURGICAL HISTORY:   Past Surgical History:   Procedure Laterality Date     CYSTOSCOPY, RETROGRADES, EXTRACT STONE, INSERT STENT, COMBINED Left 7/24/2019    Procedure: Cystoscopy, left retrograde pyelogram, interpretation of fluoroscopic images, left ureteroscopy with stone basketing, placement of 5 x 26 double-J left ureteral stent, laser on stand-by;  Surgeon: Gavino Delarosa MD;  Location:  OR       FAMILY HISTORY: History reviewed. No pertinent family history.    SOCIAL HISTORY:   Social History     Tobacco Use     Smoking status: Former Smoker     Types: Hookah     Smokeless tobacco: Never Used   Substance Use Topics     Alcohol use: Not Currently       Current Outpatient Medications   Medication     ciprofloxacin (CIPRO) 500 MG tablet     tamsulosin (FLOMAX) 0.4 MG capsule     HYDROcodone-acetaminophen (NORCO) 5-325 MG tablet     No current facility-administered medications for this visit.          PHYSICAL EXAM:    /76   Pulse 82   Ht 1.702 m (5' 7\")   Wt 83.9 kg (185 lb)   SpO2 97%   BMI 28.98 kg/m      Constitutional: Well developed. Conversant and in no acute distress  Eyes: Anicteric sclera, conjunctiva clear, normal extraocular movements  ENT: Normocephalic and atraumatic,   Skin: Warm and dry. No rashes or lesions  Cardiac: No peripheral edema  Back/Flank: Not done  CNS/PNS: Normal musculature and " movements, moves all extremities normally  Respiratory: Normal non-labored breathing  Abdomen: Soft nontender and nondistended  Peripheral Vascular: No peripheral edema  Mental Status/Psych: Alert and Oriented x 3. Normal mood and affect    Penis: Not done  Scrotal Skin: Not done  Testicles: Not done  Epididymis: Not done  Digital Rectal Exam:     Cystoscopy: I performed flexible cystoscopy today and the stent was removed without difficulty    Imaging: None    Urinalysis: UA RESULTS:  Recent Labs   Lab Test 07/23/19 2053   COLOR Yellow   APPEARANCE Clear   URINEGLC Negative   URINEBILI Negative   URINEKETONE Negative   SG >1.035*   UBLD Moderate*   URINEPH 5.0   PROTEIN 20*   NITRITE Negative   LEUKEST Negative   RBCU 19*   WBCU 4       PSA:     Post Void Residual:     Other labs: None today      IMPRESSION:  Doing well, stent removed    PLAN:  We discussed prevention methods for future stones.He will follow-up with me as needed in the future.    Total Time: 15 minutes                                      Total in Consultation: 10 minutes      Gavino Delarosa M.D.

## 2019-09-05 ENCOUNTER — OFFICE VISIT (OUTPATIENT)
Dept: FAMILY MEDICINE | Facility: CLINIC | Age: 39
End: 2019-09-05
Payer: MEDICAID

## 2019-09-05 VITALS
TEMPERATURE: 98.2 F | DIASTOLIC BLOOD PRESSURE: 85 MMHG | HEIGHT: 67 IN | WEIGHT: 196.2 LBS | HEART RATE: 69 BPM | SYSTOLIC BLOOD PRESSURE: 120 MMHG | BODY MASS INDEX: 30.79 KG/M2 | OXYGEN SATURATION: 99 %

## 2019-09-05 DIAGNOSIS — Z13.220 LIPID SCREENING: ICD-10-CM

## 2019-09-05 DIAGNOSIS — Q24.9 CONGENITAL HEART ANOMALY: ICD-10-CM

## 2019-09-05 DIAGNOSIS — Z00.00 ROUTINE HISTORY AND PHYSICAL EXAMINATION OF ADULT: Primary | ICD-10-CM

## 2019-09-05 DIAGNOSIS — Z13.1 SCREENING FOR DIABETES MELLITUS: ICD-10-CM

## 2019-09-05 LAB
BASOPHILS # BLD AUTO: 0 10E9/L (ref 0–0.2)
BASOPHILS NFR BLD AUTO: 0.3 %
DIFFERENTIAL METHOD BLD: NORMAL
EOSINOPHIL # BLD AUTO: 0.1 10E9/L (ref 0–0.7)
EOSINOPHIL NFR BLD AUTO: 1 %
ERYTHROCYTE [DISTWIDTH] IN BLOOD BY AUTOMATED COUNT: 14.5 % (ref 10–15)
HBA1C MFR BLD: 5 % (ref 0–5.6)
HCT VFR BLD AUTO: 42.9 % (ref 40–53)
HGB BLD-MCNC: 14.9 G/DL (ref 13.3–17.7)
LYMPHOCYTES # BLD AUTO: 2.8 10E9/L (ref 0.8–5.3)
LYMPHOCYTES NFR BLD AUTO: 40.5 %
MCH RBC QN AUTO: 31 PG (ref 26.5–33)
MCHC RBC AUTO-ENTMCNC: 34.7 G/DL (ref 31.5–36.5)
MCV RBC AUTO: 89 FL (ref 78–100)
MONOCYTES # BLD AUTO: 0.6 10E9/L (ref 0–1.3)
MONOCYTES NFR BLD AUTO: 9.2 %
NEUTROPHILS # BLD AUTO: 3.4 10E9/L (ref 1.6–8.3)
NEUTROPHILS NFR BLD AUTO: 49 %
PLATELET # BLD AUTO: 289 10E9/L (ref 150–450)
RBC # BLD AUTO: 4.81 10E12/L (ref 4.4–5.9)
WBC # BLD AUTO: 7 10E9/L (ref 4–11)

## 2019-09-05 PROCEDURE — 36415 COLL VENOUS BLD VENIPUNCTURE: CPT | Performed by: INTERNAL MEDICINE

## 2019-09-05 PROCEDURE — 80061 LIPID PANEL: CPT | Performed by: INTERNAL MEDICINE

## 2019-09-05 PROCEDURE — 85025 COMPLETE CBC W/AUTO DIFF WBC: CPT | Performed by: INTERNAL MEDICINE

## 2019-09-05 PROCEDURE — 83036 HEMOGLOBIN GLYCOSYLATED A1C: CPT | Performed by: INTERNAL MEDICINE

## 2019-09-05 PROCEDURE — 80048 BASIC METABOLIC PNL TOTAL CA: CPT | Performed by: INTERNAL MEDICINE

## 2019-09-05 PROCEDURE — 99385 PREV VISIT NEW AGE 18-39: CPT | Performed by: INTERNAL MEDICINE

## 2019-09-05 ASSESSMENT — MIFFLIN-ST. JEOR: SCORE: 1763.59

## 2019-09-05 NOTE — PROGRESS NOTES
SUBJECTIVE:   CC: Denver Goff is an 39 year old male who presents for preventative health visit.     Healthy Habits:     Getting at least 3 servings of Calcium per day:  NO    Bi-annual eye exam:  NO    Dental care twice a year:  Yes    Sleep apnea or symptoms of sleep apnea:  None    Diet:  Regular (no restrictions)    Frequency of exercise:  4-5 days/week    Duration of exercise:  45-60 minutes    Taking medications regularly:  Not Applicable    Barriers to taking medications:  Not applicable    Medication side effects:  Not applicable    PHQ-2 Total Score: 0    Additional concerns today:  No    Today's PHQ-2 Score:   PHQ-2 ( 1999 [employer]) 9/5/2019   Q1: Little interest or pleasure in doing things 0   Q2: Feeling down, depressed or hopeless 0   PHQ-2 Score 0       Abuse: Current or Past(Physical, Sexual or Emotional)- No  Do you feel safe in your environment? Yes    Social History     Tobacco Use     Smoking status: Former Smoker     Types: Hookah     Smokeless tobacco: Never Used   Substance Use Topics     Alcohol use: Not Currently     If you drink alcohol do you typically have >3 drinks per day or >7 drinks per week? No      Last PSA: No results found for: PSA    Reviewed orders with patient. Reviewed health maintenance and updated orders accordingly - Yes  Lab work is in process    Reviewed and updated as needed this visit by clinical staff  Tobacco  Meds  Fam Hx  Soc Hx        Reviewed and updated as needed this visit by Provider  Bandar Hoskins        Past Medical History:   Diagnosis Date     Congenital heart anomaly         Review of Systems  CONSTITUTIONAL: NEGATIVE for fever, chills, change in weight  INTEGUMENTARY/SKIN: NEGATIVE for worrisome rashes, moles or lesions  EYES: NEGATIVE for vision changes or irritation  ENT: NEGATIVE for ear, mouth and throat problems  RESP: NEGATIVE for significant cough or SOB  CV: NEGATIVE for chest pain, palpitations or peripheral edema, positive for history of  "congenital heart anomaly s/p previous cardiac surgery  GI: NEGATIVE for nausea, abdominal pain, heartburn, or change in bowel habits   male: negative for dysuria, hematuria, decreased urinary stream, erectile dysfunction, urethral discharge  MUSCULOSKELETAL: NEGATIVE for significant arthralgias or myalgia  NEURO: NEGATIVE for weakness, dizziness or paresthesias  PSYCHIATRIC: NEGATIVE for changes in mood or affect    OBJECTIVE:   /85 (BP Location: Right arm, Patient Position: Sitting, Cuff Size: Adult Regular)   Pulse 69   Temp 98.2  F (36.8  C) (Oral)   Ht 1.702 m (5' 7\")   Wt 89 kg (196 lb 3.2 oz)   SpO2 99%   BMI 30.73 kg/m      Physical Exam  GENERAL: alert and no distress  EYES: Eyes grossly normal to inspection, PERRL and conjunctivae and sclerae normal  HENT: ear canals and TM's normal, nose and mouth without ulcers or lesions  NECK: no adenopathy, no asymmetry, masses, or scars and thyroid normal to palpation  RESP: lungs clear to auscultation - no rales, rhonchi or wheezes  CV: regular rate and rhythm, normal S1 S2, no S3 or S4, no murmur, click or rub, no peripheral edema and peripheral pulses strong  ABDOMEN: soft, nontender, no hepatosplenomegaly, no masses and bowel sounds normal  MS: no gross musculoskeletal defects noted, no edema  SKIN: no suspicious lesions or rashes  NEURO: Normal strength and tone, mentation intact and speech normal  PSYCH: mentation appears normal, affect normal/bright    Diagnostic Test Results:  Labs reviewed in Epic  Results for orders placed or performed during the hospital encounter of 07/23/19   CT Abdomen Pelvis w/o Contrast    Narrative    CT ABDOMEN PELVIS W/O CONTRAST  7/23/2019 11:41 PM     INDICATION: Left lower quadrant pain.    TECHNIQUE: Thin axial images through the abdomen and pelvis without  contrast. Coronal reformatted images. Radiation dose for this scan was  reduced using automated exposure control, adjustment of the mA and/or  kV according to " patient size, or iterative reconstruction technique.    COMPARISON: 6/24/2019.    FINDINGS: Moderate left hydronephrosis due to a 0.4 cm UVJ stone. It  is unclear if this is the same stone which was seen previously. It may  be minimally larger. The degree of hydronephrosis has minimally  increased. No other urinary stones.    Liver, gallbladder, spleen, pancreas, adrenal glands and kidneys are  otherwise negative without contrast.    Pelvic structures are otherwise negative. No ascites.      Impression    IMPRESSION: Moderate left hydronephrosis due to a 0.4 cm UVJ stone.    PARVIZ CASTRO MD   XR Surgery JEANETTE Fluoro L/T 5 Min w Stills    Narrative    This exam was marked as non-reportable because it will not be read by a   radiologist or a [facility] non-radiologist provider.             Routine UA with microscopic   Result Value Ref Range    Color Urine Yellow     Appearance Urine Clear     Glucose Urine Negative NEG^Negative mg/dL    Bilirubin Urine Negative NEG^Negative    Ketones Urine Negative NEG^Negative mg/dL    Specific Gravity Urine >1.035 (H) 1.003 - 1.035    Blood Urine Moderate (A) NEG^Negative    pH Urine 5.0 5.0 - 7.0 pH    Protein Albumin Urine 20 (A) NEG^Negative mg/dL    Urobilinogen mg/dL Normal 0.0 - 2.0 mg/dL    Nitrite Urine Negative NEG^Negative    Leukocyte Esterase Urine Negative NEG^Negative    Source Midstream Urine     WBC Urine 4 0 - 5 /HPF    RBC Urine 19 (H) 0 - 2 /HPF    Squamous Epithelial /HPF Urine <1 0 - 1 /HPF    Transitional Epi 1 0 - 1 /HPF    Mucous Urine Present (A) NEG^Negative /LPF   CBC with platelets differential   Result Value Ref Range    WBC 9.2 4.0 - 11.0 10e9/L    RBC Count 4.94 4.4 - 5.9 10e12/L    Hemoglobin 14.8 13.3 - 17.7 g/dL    Hematocrit 44.0 40.0 - 53.0 %    MCV 89 78 - 100 fl    MCH 30.0 26.5 - 33.0 pg    MCHC 33.6 31.5 - 36.5 g/dL    RDW 13.3 10.0 - 15.0 %    Platelet Count 250 150 - 450 10e9/L    Diff Method Automated Method     % Neutrophils 78.6 %     % Lymphocytes 12.2 %    % Monocytes 8.4 %    % Eosinophils 0.3 %    % Basophils 0.2 %    % Immature Granulocytes 0.3 %    Nucleated RBCs 0 0 /100    Absolute Neutrophil 7.2 1.6 - 8.3 10e9/L    Absolute Lymphocytes 1.1 0.8 - 5.3 10e9/L    Absolute Monocytes 0.8 0.0 - 1.3 10e9/L    Absolute Eosinophils 0.0 0.0 - 0.7 10e9/L    Absolute Basophils 0.0 0.0 - 0.2 10e9/L    Abs Immature Granulocytes 0.0 0 - 0.4 10e9/L    Absolute Nucleated RBC 0.0    Basic metabolic panel   Result Value Ref Range    Sodium 136 133 - 144 mmol/L    Potassium 4.0 3.4 - 5.3 mmol/L    Chloride 104 94 - 109 mmol/L    Carbon Dioxide 25 20 - 32 mmol/L    Anion Gap 7 3 - 14 mmol/L    Glucose 107 (H) 70 - 99 mg/dL    Urea Nitrogen 17 7 - 30 mg/dL    Creatinine 1.27 (H) 0.66 - 1.25 mg/dL    GFR Estimate 71 >60 mL/min/[1.73_m2]    GFR Estimate If Black 82 >60 mL/min/[1.73_m2]    Calcium 8.9 8.5 - 10.1 mg/dL   Basic metabolic panel   Result Value Ref Range    Sodium 137 133 - 144 mmol/L    Potassium 3.8 3.4 - 5.3 mmol/L    Chloride 105 94 - 109 mmol/L    Carbon Dioxide 27 20 - 32 mmol/L    Anion Gap 5 3 - 14 mmol/L    Glucose 102 (H) 70 - 99 mg/dL    Urea Nitrogen 15 7 - 30 mg/dL    Creatinine 1.27 (H) 0.66 - 1.25 mg/dL    GFR Estimate 71 >60 mL/min/[1.73_m2]    GFR Estimate If Black 82 >60 mL/min/[1.73_m2]    Calcium 8.3 (L) 8.5 - 10.1 mg/dL   Surgical pathology exam   Result Value Ref Range    Copath Report       Patient Name: WAN BLAS  MR#: [id number]  Specimen #: [id number]  Collected: 7/24/2019  Received: 7/24/2019  Reported: 7/24/2019 14:58  Ordering Phy(s): CHAVA HARRELL  Additional Phy(s): MELINA PARRA    For improved result formatting, select 'View Enhanced Report Format' under   Linked Documents section.    SPECIMEN(S):  Stone, left ureteral    FINAL DIAGNOSIS:  Material submitted as left ureteral stone.  - Calculus present.  Specimen sent entirely for chemical/stone analysis.    See separate forthcoming stone  analysis  "report.    Electronically signed out by:    Kvng Segal M.D.    CLINICAL HISTORY:  Stone.    GROSS:  The specimen is received fresh, labeled with the patient's name proper   identifying information and designated  \"left ureteral stone\".  It consists of a 0.3 cm brown, irregular firm   calculus.  The specimen is sent entirely  for chemical/stone analysis.  Gross only exam. See separate forthcoming   stone analysis report. (Dictated by:  TAVIA Jacinto 7/24/2019 02:17 PM)/KAMRON    Technical and professional completed at the [facility]   [facility], [address], [place]  [address] ([phone])    CPT Codes:  A: 55278-SX    COLLECTION SITE:  Client: [facility]  Location: [facility] (R)       EKG 12-lead, tracing only   Result Value Ref Range    Interpretation ECG Click View Image link to view waveform and result    Stone analysis   Result Value Ref Range    Stone Composition SEE NOTE     Calculi Number 1     Calculi Size 1 to 4 mm    Calculi Description SEE NOTE     Stone Mass 16 mg       ASSESSMENT/PLAN:   (Z00.00) Routine history and physical examination of adult  (primary encounter diagnosis)  Comment: yearly physical exam today  Plan: CBC with platelets and differential, Basic         metabolic panel  (Ca, Cl, CO2, Creat, Gluc, K,         Na, BUN)      (Z13.220) Lipid screening  Comment: patient is due for lipid panel lab  Plan: Lipid panel reflex to direct LDL Fasting      (Z13.1) Screening for diabetes mellitus  Comment: patient is due for diabetes screening  Plan: Hemoglobin A1c      (Q24.9) Congenital heart anomaly  Comment: history of congenital heart anomaly s/p previous cardiac surgery, patient is overdue for cardiology follow up. No chest pains.  Plan: CARDIO  ADULT REFERRAL      COUNSELING:   Reviewed preventive health counseling, as reflected in patient instructions  Special attention given to:        Regular exercise       Healthy " "diet/nutrition    Estimated body mass index is 30.73 kg/m  as calculated from the following:    Height as of this encounter: 1.702 m (5' 7\").    Weight as of this encounter: 89 kg (196 lb 3.2 oz).     Weight management plan: Discussed healthy diet and exercise guidelines     reports that he has quit smoking. His smoking use included hookah. He has never used smokeless tobacco.      Counseling Resources:  ATP IV Guidelines  Pooled Cohorts Equation Calculator  FRAX Risk Assessment  ICSI Preventive Guidelines  Dietary Guidelines for Americans, 2010  USDA's MyPlate  ASA Prophylaxis  Lung CA Screening    Tessy Reina MD  [facility]    "

## 2019-09-06 LAB
ANION GAP SERPL CALCULATED.3IONS-SCNC: 6 MMOL/L (ref 3–14)
BUN SERPL-MCNC: 13 MG/DL (ref 7–30)
CALCIUM SERPL-MCNC: 9.1 MG/DL (ref 8.5–10.1)
CHLORIDE SERPL-SCNC: 109 MMOL/L (ref 94–109)
CHOLEST SERPL-MCNC: 252 MG/DL
CO2 SERPL-SCNC: 24 MMOL/L (ref 20–32)
CREAT SERPL-MCNC: 0.81 MG/DL (ref 0.66–1.25)
GFR SERPL CREATININE-BSD FRML MDRD: >90 ML/MIN/{1.73_M2}
GLUCOSE SERPL-MCNC: 76 MG/DL (ref 70–99)
HDLC SERPL-MCNC: 48 MG/DL
LDLC SERPL CALC-MCNC: 180 MG/DL
NONHDLC SERPL-MCNC: 204 MG/DL
POTASSIUM SERPL-SCNC: 4.2 MMOL/L (ref 3.4–5.3)
SODIUM SERPL-SCNC: 139 MMOL/L (ref 133–144)
TRIGL SERPL-MCNC: 118 MG/DL

## 2019-09-18 ENCOUNTER — TELEPHONE (OUTPATIENT)
Dept: CARDIOLOGY | Facility: CLINIC | Age: 39
End: 2019-09-18

## 2019-09-18 NOTE — TELEPHONE ENCOUNTER
Patient scheduled in [facility], left  regarding us receiving his records.    Attempted to call pt back to inform that no records have been sent through yet.    Left  with callback and fax number.

## 2019-10-21 DIAGNOSIS — Q24.8 DOUBLE-CHAMBERED RIGHT VENTRICLE: Primary | ICD-10-CM

## 2019-10-24 ENCOUNTER — HOSPITAL ENCOUNTER (OUTPATIENT)
Dept: LAB | Facility: CLINIC | Age: 39
End: 2019-10-24
Attending: INTERNAL MEDICINE
Payer: MEDICAID

## 2019-10-24 ENCOUNTER — OFFICE VISIT (OUTPATIENT)
Dept: CARDIOLOGY | Facility: CLINIC | Age: 39
End: 2019-10-24
Attending: INTERNAL MEDICINE
Payer: MEDICAID

## 2019-10-24 ENCOUNTER — HOSPITAL ENCOUNTER (OUTPATIENT)
Dept: CARDIOLOGY | Facility: CLINIC | Age: 39
Discharge: HOME | End: 2019-10-24
Attending: INTERNAL MEDICINE | Admitting: INTERNAL MEDICINE
Payer: MEDICAID

## 2019-10-24 VITALS
SYSTOLIC BLOOD PRESSURE: 112 MMHG | WEIGHT: 197.2 LBS | DIASTOLIC BLOOD PRESSURE: 72 MMHG | HEART RATE: 74 BPM | BODY MASS INDEX: 30.95 KG/M2 | HEIGHT: 67 IN

## 2019-10-24 DIAGNOSIS — Q24.8 DOUBLE-CHAMBERED RIGHT VENTRICLE: ICD-10-CM

## 2019-10-24 DIAGNOSIS — E78.5 HYPERLIPIDEMIA LDL GOAL <160: ICD-10-CM

## 2019-10-24 DIAGNOSIS — Q24.8 DOUBLE-CHAMBERED RIGHT VENTRICLE: Primary | ICD-10-CM

## 2019-10-24 LAB — TSH SERPL DL<=0.005 MIU/L-ACNC: 2.12 MU/L (ref 0.4–4)

## 2019-10-24 PROCEDURE — 36415 COLL VENOUS BLD VENIPUNCTURE: CPT | Performed by: INTERNAL MEDICINE

## 2019-10-24 PROCEDURE — 84443 ASSAY THYROID STIM HORMONE: CPT | Performed by: INTERNAL MEDICINE

## 2019-10-24 PROCEDURE — 99203 OFFICE O/P NEW LOW 30 MIN: CPT | Mod: GC | Performed by: INTERNAL MEDICINE

## 2019-10-24 PROCEDURE — 93325 DOPPLER ECHO COLOR FLOW MAPG: CPT

## 2019-10-24 RX ORDER — ATORVASTATIN CALCIUM 20 MG/1
20 TABLET, FILM COATED ORAL DAILY
Qty: 90 TABLET | Refills: 3 | Status: SHIPPED | OUTPATIENT
Start: 2019-10-24 | End: 2019-11-14

## 2019-10-24 ASSESSMENT — MIFFLIN-ST. JEOR: SCORE: 1768.12

## 2019-10-24 NOTE — PATIENT INSTRUCTIONS
lipitor 20mg  4-6 weeks with cholesterol    You were seen today in the Adult Congenital and Cardiovascular Genetics Clinic at the [facility].    Cardiology Providers you saw during your visit:  LUANNE Oliver MD    Diagnosis:  Double-chambered right ventricle    Results:  LUANNE Oliver MD reviewed the results of your echocardiogram and laboratory testing today in clinic.    Recommendations:    1. Continue to eat a heart healthy, low salt diet.  2. Continue to get 20-30 minutes of aerobic activity, 4-5 days per week.  Examples of aerobic activity include walking, running, swimming, cycling, etc.  3. Continue to observe good oral hygiene, with regular dental visits.  4. Begin taking Lipitor 20 mg once daily.      SBE prophylaxis:   Yes____  No_X___    Lifelong Bacterial Endocarditis Prophylaxis:  YES____  NO____    If YES is checked, follow the recommendations outlined below:  1. Take antibiotic(s) prior to recommended dental procedures and procedures on the respiratory tract or with infected skin, muscle or bones. SBE prophylaxis is not needed for routine GI and  procedures (ie. Colonoscopy or vaginal delivery)  2. Observe good oral hygiene daily, as advised by your dentist. Get regular professional dental care.  3. Keep cuts clean.  4. Infections should be treated promptly.  5. Symptoms of Infective Endocarditis could include: fever lasting more than 4-5 days or a recurrent fever that initially resolves but returns within 1-2 days)      Exercise restrictions:   Yes__X__  No____         If yes, list restrictions:  Must be allowed to rest if fatigued or SOB      Work restrictions:  Yes____  No_X___         If yes, list restrictions:    FASTING CHOLESTEROL was checked in the last 5 years YES_X__  NO___ (2019)  Continue to eat a heart healthy, low salt diet.         ____ Fasting lipid panel order today         ____ No changes in medications          ____ I recommend the following changes in your  cholesterol medications.:          ____ Please follow up for cholesterol screening at your primary care physician      Follow-up:  Follow up in Dr. Oliver's clinic in 4-6 weeks with lipid panel.     If you have questions or concerns please contact us at:    Doreen Castle, MSN, RN, CNL    Lorena Awad (Scheduling)  Nurse Care Coordinator     Clinic   Adult Congenital and CV Genetics   Adult Congenital and CV Genetic  [facility]  (P) [phone]     (P) [phone]  [email or web address]   (F) [phone]        For after hours urgent needs, call [phone] and ask to speak to the Adult Congenital Physician on call.  Mention Job Code 0401.    For emergencies call 911.    [facility]   Clinics and Surgery Center  Mail Code [id number]  [address] [place] [address]

## 2019-10-24 NOTE — PROGRESS NOTES
Congenital Cardiology Visit    Patient:  Denver Goff MRN:  [id number]   YOB: 1980 Age:  39 year old   Date of Visit:  10/24/2019 PCP:  Tessy Reina MD     Dear Dr. Reina:    I had the pleasure of seeing Denver Goff at the [facility] in [facility] on 10/24/2019 in consultation for history of double chamber right ventricle. He presented today alone. As you know, he is a 39 year old male with history of double chambered right ventricle who underwent release of the double chambered right ventricle on 2008 at [facility] (Dr. Ac); surgery was uncomplicated except for possible arrhythmia that is now resolved. He was being followed by Dr. Grigsby and his last cardiology visit was on 2016, therefore he is here today to establish cardiology care as he has relocated to [place]. Denver did not report fatigue, exercise intolerance, diaphoresis, tachycardia, chest pain, poor feeding, dyspnea, syncope, dizziness, palpitations, cyanosis, edema.     Past medical history:   - Double chambered right ventricle s/p repair  - Hyperlipidemia. On medications 7 years ago but stopped because he was forgetting his doses.   - Nephrolithiasis in 2019    He has a current medication list which includes the following Facility-Administered Medications: lidocaine. He is allergic to no known allergies.    Family History:  Family history is negative for congenital heart disease or acquired structural heart disease, sudden or unexplained death including crib death, congenital deafness, early coronary/cerebrovascular disease, heritable syndromes. His sister is pregnant and she was told the fetus has a hole in the heart.     Mother has hypertension. Father  of a lung cancer (heavy smoker). Both parents and siblings with hyperlipidemia.     Social History: Single. No children. 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 He used to smoke from age 24 until 7 years ago. Exercises 2-3 times a week for about an hour at a time.     The Review of Systems is negative other than noted in the HPI.    Physical Examination:  /72 (BP Location: Left arm, Patient Position: Sitting, Cuff Size: Adult Regular)   Pulse 74   Ht 1.702 m (5' 7\")   Wt 89.4 kg (197 lb 3.2 oz)   BMI 30.89 kg/m    GENERAL: Non-distressed  SKIN: Sternotomy scar  HEENT: Moist mucosa  LUNGS: Normal symmetric air entry, normal WOB, no rales/rhonchi/wheezes  HEART: Quiet precordium, regular rhythm, normal S1/S2, no murmurs, no rub or gallop  ABDOMEN: Soft, not distended or tender, normoactive BS, no hepatomegaly  EXTREMITIES: Warm, well perfused, no clubbing or edema, pulses 2+ throughout   NEUROLOGIC: Alert, oriented, cooperative    We reviewed his echo from 10/21/2019, which showed a unobstructed flow through the right ventricular outflow tract. There is mild right ventricular hypertrophy. Low normal right ventricular systolic function. Normal left ventricular size and systolic function. Mild (1+) tricuspid valve insufficiency. Normal right ventricular systolic pressure.    Assessment and Plan: Denver is a 39 year old male with history of double chambered right ventricle status post uncomplicated surgical repair. He has been doing well and is currently asymptomatic with essentially normal echocardiogram. Of note, his lipid panel showed elevated LDL at 180mg/dL; given the strong family history, we discussed starting atorvastatin today with the goal of keeping his LDL below 120mg/dL. We discussed findings today with him and he verbalized understanding. He will follow-up in 4-6 weeks with a lipid panel and CMP. He has no activity restrictions. No antibiotic prophylaxis required for invasive procedures.    Thank you for the opportunity to meet Denver. Please don't hesitate to contact me with questions or " concerns.    Note prepared by,     Gene Allison MD  Pediatric Cardiology Fellow   [facility]    ATTESTATION:  Mr. Goff was seen and examined by me and I have reviewed all the records and testing personally.  I agree with the note and above plan of care that was mutually decided on by myself and Dr. Allison.   LUANNE Oliver MD

## 2019-10-24 NOTE — LETTER
10/24/2019    Tessy Reina MD  [address]  [facility] [address]    RE: Denver Goff       Dear Colleague,    I had the pleasure of seeing Denver Goff in the [facility].    Congenital Cardiology Visit    Patient:  Denver Goff MRN:  [id number]   YOB: 1980 Age:  39 year old   Date of Visit:  10/24/2019 PCP:  Tessy Reina MD     Dear Dr. Reina:    I had the pleasure of seeing Denver Goff at the [facility] in [facility] on 10/24/2019 in consultation for history of double chamber right ventricle. He presented today alone. As you know, he is a 39 year old male with history of double chambered right ventricle who underwent release of the double chambered right ventricle on 9/22/2008 at [facility] (Dr. Ac); surgery was uncomplicated except for possible arrhythmia that is now resolved. He was being followed by Dr. Grigsby and his last cardiology visit was on 2/24/2016, therefore he is here today to establish cardiology care as he has relocated to [place]. Denver did not report fatigue, exercise intolerance, diaphoresis, tachycardia, chest pain, poor feeding, dyspnea, syncope, dizziness, palpitations, cyanosis, edema.     Past medical history:   - Double chambered right ventricle s/p repair  - Hyperlipidemia. On medications 7 years ago but stopped because he was forgetting his doses.   - Nephrolithiasis in July 2019    He has a current medication list which includes the following Facility-Administered Medications: lidocaine. He is allergic to no known allergies.    Family History:  Family history is negative for congenital heart disease or acquired structural heart disease, sudden or unexplained death including crib death, congenital deafness, early coronary/cerebrovascular disease, heritable syndromes. His sister is pregnant and she was told the fetus has a hole in  "the heart.     Mother has hypertension. Father  of a lung cancer (heavy smoker). Both parents and siblings with hyperlipidemia.     Social History: Single. No children. 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 He used to smoke from age 24 until 7 years ago. Exercises 2-3 times a week for about an hour at a time.     The Review of Systems is negative other than noted in the HPI.    Physical Examination:  /72 (BP Location: Left arm, Patient Position: Sitting, Cuff Size: Adult Regular)   Pulse 74   Ht 1.702 m (5' 7\")   Wt 89.4 kg (197 lb 3.2 oz)   BMI 30.89 kg/m     GENERAL: Non-distressed  SKIN: Sternotomy scar  HEENT: Moist mucosa  LUNGS: Normal symmetric air entry, normal WOB, no rales/rhonchi/wheezes  HEART: Quiet precordium, regular rhythm, normal S1/S2, no murmurs, no rub or gallop  ABDOMEN: Soft, not distended or tender, normoactive BS, no hepatomegaly  EXTREMITIES: Warm, well perfused, no clubbing or edema, pulses 2+ throughout   NEUROLOGIC: Alert, oriented, cooperative    We reviewed his echo from 10/21/2019, which showed a unobstructed flow through the right ventricular outflow tract. There is mild right ventricular hypertrophy. Low normal right ventricular systolic function. Normal left ventricular size and systolic function. Mild (1+) tricuspid valve insufficiency. Normal right ventricular systolic pressure.    Assessment and Plan: Denver is a 39 year old male with history of double chambered right ventricle status post uncomplicated surgical repair. He has been doing well and is currently asymptomatic with essentially normal echocardiogram. Of note, his lipid panel showed elevated LDL at 180mg/dL; given the strong family history, we discussed starting atorvastatin today with the goal of keeping his LDL below 120mg/dL. We discussed findings today with him and he verbalized understanding. He will follow-up in 4-6 weeks with " a lipid panel and CMP. He has no activity restrictions. No antibiotic prophylaxis required for invasive procedures.    Thank you for the opportunity to meet Denver. Please don't hesitate to contact me with questions or concerns.    Note prepared by,     Gene Allison MD  Pediatric Cardiology Fellow   [facility]    ATTESTATION:  Mr. Goff was seen and examined by me and I have reviewed all the records and testing personally.  I agree with the note and above plan of care that was mutually decided on by myself and Dr. Allison.   LUANNE Oliver MD     Thank you for allowing me to participate in the care of your patient.    Sincerely,     Ronaldo Oliver MD     [facility]

## 2019-11-11 ENCOUNTER — HOSPITAL ENCOUNTER (OUTPATIENT)
Dept: LAB | Facility: CLINIC | Age: 39
Discharge: HOME | End: 2019-11-11
Attending: INTERNAL MEDICINE | Admitting: INTERNAL MEDICINE
Payer: COMMERCIAL

## 2019-11-11 DIAGNOSIS — E78.5 HYPERLIPIDEMIA LDL GOAL <160: ICD-10-CM

## 2019-11-11 LAB
CHOLEST SERPL-MCNC: 171 MG/DL
HDLC SERPL-MCNC: 55 MG/DL
LDLC SERPL CALC-MCNC: 93 MG/DL
NONHDLC SERPL-MCNC: 116 MG/DL
TRIGL SERPL-MCNC: 114 MG/DL

## 2019-11-11 PROCEDURE — 36415 COLL VENOUS BLD VENIPUNCTURE: CPT | Performed by: INTERNAL MEDICINE

## 2019-11-11 PROCEDURE — 80061 LIPID PANEL: CPT | Performed by: INTERNAL MEDICINE

## 2019-11-14 ENCOUNTER — OFFICE VISIT (OUTPATIENT)
Dept: CARDIOLOGY | Facility: CLINIC | Age: 39
End: 2019-11-14
Attending: INTERNAL MEDICINE
Payer: COMMERCIAL

## 2019-11-14 VITALS
HEIGHT: 67 IN | HEART RATE: 83 BPM | BODY MASS INDEX: 31.27 KG/M2 | DIASTOLIC BLOOD PRESSURE: 86 MMHG | SYSTOLIC BLOOD PRESSURE: 127 MMHG | WEIGHT: 199.2 LBS

## 2019-11-14 DIAGNOSIS — Q24.8 DOUBLE-CHAMBERED RIGHT VENTRICLE: ICD-10-CM

## 2019-11-14 DIAGNOSIS — E78.5 HYPERLIPIDEMIA LDL GOAL <160: ICD-10-CM

## 2019-11-14 PROCEDURE — 99213 OFFICE O/P EST LOW 20 MIN: CPT | Performed by: INTERNAL MEDICINE

## 2019-11-14 RX ORDER — ROSUVASTATIN CALCIUM 10 MG/1
10 TABLET, COATED ORAL DAILY
Qty: 30 TABLET | Refills: 11 | Status: SHIPPED | OUTPATIENT
Start: 2019-11-14 | End: 2021-02-18

## 2019-11-14 RX ORDER — ROSUVASTATIN CALCIUM 10 MG/1
10 TABLET, COATED ORAL DAILY
Qty: 90 TABLET | Refills: 3 | Status: SHIPPED | OUTPATIENT
Start: 2019-11-14 | End: 2019-11-14

## 2019-11-14 ASSESSMENT — MIFFLIN-ST. JEOR: SCORE: 1777.2

## 2019-11-14 NOTE — PROGRESS NOTES
HPI and Plan:   I had the pleasure of seeing Denver Goff today in a shared visit in adult congential cardiology clinic follow up. He is a pleasant 39 year old patient of Dr. Oliver. Mr. Goff has a history of double chamber right ventricle status post repair.  He also has a history of hyperlipidemia, but stopped medication 7 years ago.    At his first visit he was started on Lipitor 20 mg, he has had remarkable results with his LDL going from 180 down to 93.  Unfortunately he has been suffering from diarrhea and it has not improved.     As you know, he is a 39 year old male with history of double chambered right ventricle who underwent release of the double chambered right ventricle on 2008 at [facility] (Dr. Ac); surgery was uncomplicated except for possible arrhythmia that is now resolved. He was being followed by Dr. Grigsby and his last cardiology visit was on 2016.  He does not have a family history of congenital heart disease.  His mother does have hypertension and his father  from his long term smoking habit.    He continues to feel well.  His only complaint today is the side effect of diarrhea from his medication.     Physical Exam  Please see Below     Assessment and Plan  1.  Hyperlipidemia.  Since starting Lipitor 20, he has had great results.  His labs today show a cholesterol 171, HDL 55 and LDL 93.  Previously his total cholesterol is 252, HDL 48 and .  Unfortunately he is having significant diarrhea with Lipitor.  We talked about different options including just lowering the dose.  Ultimately we decided to switch him to Crestor, will start 10 mg daily.  He will call if there is any problems.  We will check his lipids again in a year.  2.  History of double chamber right ventricle status post uncomplicated surgical repair.  He continues to do well with his most recent echo essentially been normal.    Thank you for allowing me to care for Denver Goff today.  He  will follow-up with Dr. Oliver in congenital clinic in 1 years time, we will check lipids again at that time.    DEVYN Short, CNP  Cardiology    Voice recognition software was used for this note, I have reviewed this note, but errors may have been missed.    No orders of the defined types were placed in this encounter.    No orders of the defined types were placed in this encounter.    There are no discontinued medications.      CURRENT MEDICATIONS:  Current Outpatient Medications   Medication Sig Dispense Refill     atorvastatin (LIPITOR) 20 MG tablet Take 1 tablet (20 mg) by mouth daily 90 tablet 3       ALLERGIES     Allergies   Allergen Reactions     No Known Allergies        PAST MEDICAL HISTORY:  Past Medical History:   Diagnosis Date     Congenital heart anomaly        PAST SURGICAL HISTORY:  Past Surgical History:   Procedure Laterality Date     CYSTOSCOPY, RETROGRADES, EXTRACT STONE, INSERT STENT, COMBINED Left 7/24/2019    Procedure: Cystoscopy, left retrograde pyelogram, interpretation of fluoroscopic images, left ureteroscopy with stone basketing, placement of 5 x 26 double-J left ureteral stent, laser on stand-by;  Surgeon: Gavino Delarosa MD;  Location:  OR       FAMILY HISTORY:  Family History   Problem Relation Age of Onset     Hypertension Mother      Hyperlipidemia Father      Lung Cancer Father        SOCIAL HISTORY:  Social History     Socioeconomic History     Marital status: Single     Spouse name: None     Number of children: None     Years of education: None     Highest education level: None   Occupational History     None   Social Needs     Financial resource strain: None     Food insecurity:     Worry: None     Inability: None     Transportation needs:     Medical: None     Non-medical: None   Tobacco Use     Smoking status: Former Smoker     Types: Hookah     Smokeless tobacco: Never Used   Substance and Sexual Activity     Alcohol use: Not Currently     Drug use: Never  "    Sexual activity: Yes     Partners: Female   Lifestyle     Physical activity:     Days per week: None     Minutes per session: None     Stress: None   Relationships     Social connections:     Talks on phone: None     Gets together: None     Attends Hindu service: None     Active member of club or organization: None     Attends meetings of clubs or organizations: None     Relationship status: None     Intimate partner violence:     Fear of current or ex partner: None     Emotionally abused: None     Physically abused: None     Forced sexual activity: None   Other Topics Concern     Parent/sibling w/ CABG, MI or angioplasty before 65F 55M? Not Asked   Social History Narrative     None       Review of Systems:  Skin:  Negative       Eyes:  Negative      ENT:  Negative      Respiratory:  Negative       Cardiovascular:  Negative      Gastroenterology: Negative      Genitourinary:  Negative      Musculoskeletal:  Negative      Neurologic:  Negative      Psychiatric:  Negative      Heme/Lymph/Imm:  Negative      Endocrine:  Negative        Physical Exam:  Vitals: /86   Pulse 83   Ht 1.702 m (5' 7\")   Wt 90.4 kg (199 lb 3.2 oz)   BMI 31.20 kg/m      Constitutional:           Skin:             Head:           Eyes:           Lymph:      ENT:           Neck:           Respiratory:            Cardiac:                                                           GI:           Extremities and Muscular Skeletal:                 Neurological:           Psych:       Encounter Diagnoses   Name Primary?     Double-chambered right ventricle      Hyperlipidemia LDL goal <160        Recent Lab Results:  LIPID RESULTS:  Lab Results   Component Value Date    CHOL 171 11/11/2019    HDL 55 11/11/2019    LDL 93 11/11/2019    TRIG 114 11/11/2019       LIVER ENZYME RESULTS:  No results found for: AST, ALT    CBC RESULTS:  Lab Results   Component Value Date    WBC 7.0 09/05/2019    RBC 4.81 09/05/2019    HGB 14.9 09/05/2019    " HCT 42.9 09/05/2019    MCV 89 09/05/2019    MCH 31.0 09/05/2019    MCHC 34.7 09/05/2019    RDW 14.5 09/05/2019     09/05/2019       BMP RESULTS:  Lab Results   Component Value Date     09/05/2019    POTASSIUM 4.2 09/05/2019    CHLORIDE 109 09/05/2019    CO2 24 09/05/2019    ANIONGAP 6 09/05/2019    GLC 76 09/05/2019    BUN 13 09/05/2019    CR 0.81 09/05/2019    GFRESTIMATED >90 09/05/2019    GFRESTBLACK >90 09/05/2019    LUIGI 9.1 09/05/2019        A1C RESULTS:  Lab Results   Component Value Date    A1C 5.0 09/05/2019       INR RESULTS:  No results found for: INR        CC  Tessy Reina MD  [address]  [place] [address]                      This is a shared visit between myself and DEVYN Nichole. I was present for ...% of the visit. This patient was seen and examined by me. We discussed the patient and plan of care including medication changes. I agree with the plan outlined above.   Dr. Ronaldo Oliver MD

## 2019-11-14 NOTE — LETTER
2019    Tessy Reina MD  [address]  [facility] [address]    RE: Denver Goff       Dear Colleague,    I had the pleasure of seeing Denver Goff in the [facility].    HPI and Plan:   I had the pleasure of seeing Denver Goff today in a shared visit in [facility]. He is a pleasant 39 year old patient of Dr. Oliver. Mr. Goff has a history of double chamber right ventricle status post repair.  He also has a history of hyperlipidemia, but stopped medication 7 years ago.    At his first visit he was started on Lipitor 20 mg, he has had remarkable results with his LDL going from 180 down to 93.  Unfortunately he has been suffering from diarrhea and it has not improved.     As you know, he is a 39 year old male with history of double chambered right ventricle who underwent release of the double chambered right ventricle on 2008 at [facility] (Dr. Ac); surgery was uncomplicated except for possible arrhythmia that is now resolved. He was being followed by Dr. Grigsby and his last cardiology visit was on 2016.  He does not have a family history of congenital heart disease.  His mother does have hypertension and his father  from his long term smoking habit.    He continues to feel well.  His only complaint today is the side effect of diarrhea from his medication.     Physical Exam  Please see Below     Assessment and Plan  1.  Hyperlipidemia.  Since starting Lipitor 20, he has had great results.  His labs today show a cholesterol 171, HDL 55 and LDL 93.  Previously his total cholesterol is 252, HDL 48 and .  Unfortunately he is having significant diarrhea with Lipitor.  We talked about different options including just lowering the dose.  Ultimately we decided to switch him to Crestor, will start 10 mg daily.  He will call if there is any problems.  We will check his lipids again in a year.  2.   History of double chamber right ventricle status post uncomplicated surgical repair.  He continues to do well with his most recent echo essentially been normal.    Thank you for allowing me to care for Denver Goff today.  He will follow-up with Dr. Oliver in congenital clinic in 1 years time, we will check lipids again at that time.    DEVYN Short, CNP  Cardiology    Voice recognition software was used for this note, I have reviewed this note, but errors may have been missed.    No orders of the defined types were placed in this encounter.    No orders of the defined types were placed in this encounter.    There are no discontinued medications.      CURRENT MEDICATIONS:  Current Outpatient Medications   Medication Sig Dispense Refill     atorvastatin (LIPITOR) 20 MG tablet Take 1 tablet (20 mg) by mouth daily 90 tablet 3       ALLERGIES     Allergies   Allergen Reactions     No Known Allergies        PAST MEDICAL HISTORY:  Past Medical History:   Diagnosis Date     Congenital heart anomaly        PAST SURGICAL HISTORY:  Past Surgical History:   Procedure Laterality Date     CYSTOSCOPY, RETROGRADES, EXTRACT STONE, INSERT STENT, COMBINED Left 7/24/2019    Procedure: Cystoscopy, left retrograde pyelogram, interpretation of fluoroscopic images, left ureteroscopy with stone basketing, placement of 5 x 26 double-J left ureteral stent, laser on stand-by;  Surgeon: Gavino Delarosa MD;  Location: [facility] OR       FAMILY HISTORY:  Family History   Problem Relation Age of Onset     Hypertension Mother      Hyperlipidemia Father      Lung Cancer Father        SOCIAL HISTORY:  Social History     Socioeconomic History     Marital status: Single     Spouse name: None     Number of children: None     Years of education: None     Highest education level: None   Occupational History     None   Social Needs     Financial resource strain: None     Food insecurity:     Worry: None     Inability: None      "Transportation needs:     Medical: None     Non-medical: None   Tobacco Use     Smoking status: Former Smoker     Types: Hookah     Smokeless tobacco: Never Used   Substance and Sexual Activity     Alcohol use: Not Currently     Drug use: Never     Sexual activity: Yes     Partners: Female   Lifestyle     Physical activity:     Days per week: None     Minutes per session: None     Stress: None   Relationships     Social connections:     Talks on phone: None     Gets together: None     Attends Confucianist service: None     Active member of club or organization: None     Attends meetings of clubs or organizations: None     Relationship status: None     Intimate partner violence:     Fear of current or ex partner: None     Emotionally abused: None     Physically abused: None     Forced sexual activity: None   Other Topics Concern     Parent/sibling w/ CABG, MI or angioplasty before 65F 55M? Not Asked   Social History Narrative     None       Review of Systems:  Skin:  Negative       Eyes:  Negative      ENT:  Negative      Respiratory:  Negative       Cardiovascular:  Negative      Gastroenterology: Negative      Genitourinary:  Negative      Musculoskeletal:  Negative      Neurologic:  Negative      Psychiatric:  Negative      Heme/Lymph/Imm:  Negative      Endocrine:  Negative        Physical Exam:  Vitals: /86   Pulse 83   Ht 1.702 m (5' 7\")   Wt 90.4 kg (199 lb 3.2 oz)   BMI 31.20 kg/m       Constitutional:           Skin:             Head:           Eyes:           Lymph:      ENT:           Neck:           Respiratory:            Cardiac:                                                           GI:           Extremities and Muscular Skeletal:                 Neurological:           Psych:       Encounter Diagnoses   Name Primary?     Double-chambered right ventricle      Hyperlipidemia LDL goal <160        Recent Lab Results:  LIPID RESULTS:  Lab Results   Component Value Date    CHOL 171 11/11/2019    " HDL 55 11/11/2019    LDL 93 11/11/2019    TRIG 114 11/11/2019       LIVER ENZYME RESULTS:  No results found for: AST, ALT    CBC RESULTS:  Lab Results   Component Value Date    WBC 7.0 09/05/2019    RBC 4.81 09/05/2019    HGB 14.9 09/05/2019    HCT 42.9 09/05/2019    MCV 89 09/05/2019    MCH 31.0 09/05/2019    MCHC 34.7 09/05/2019    RDW 14.5 09/05/2019     09/05/2019       BMP RESULTS:  Lab Results   Component Value Date     09/05/2019    POTASSIUM 4.2 09/05/2019    CHLORIDE 109 09/05/2019    CO2 24 09/05/2019    ANIONGAP 6 09/05/2019    GLC 76 09/05/2019    BUN 13 09/05/2019    CR 0.81 09/05/2019    GFRESTIMATED >90 09/05/2019    GFRESTBLACK >90 09/05/2019    LUIGI 9.1 09/05/2019        A1C RESULTS:  Lab Results   Component Value Date    A1C 5.0 09/05/2019       INR RESULTS:  No results found for: INR        CC  Tessy Reina MD  [address] [facility] [address]  [place] [address]                      This is a shared visit between myself and DEVYN Nichole. I was present for ...% of the visit. This patient was seen and examined by me. We discussed the patient and plan of care including medication changes. I agree with the plan outlined above.   Dr. Ronaldo Oliver MD      Thank you for allowing me to participate in the care of your patient.    Sincerely,     Ronaldo Oliver MD     [facility]

## 2019-11-14 NOTE — PATIENT INSTRUCTIONS
Begin taking Crestor 10mg daily.    Call us if you have questions or concerns.     Edna  [phone]

## 2019-11-14 NOTE — NURSING NOTE
Diet: Patient instructed regarding a heart healthy diet, including discussion of reduced fat and sodium intake. Patient demonstrated understanding of this information and agreed to call with further questions or concerns.  Labs: Patient was given results of the laboratory testing obtained today. Patient was instructed to return for the next laboratory testing in one year. Patient demonstrated understanding of this information and agreed to call with further questions or concerns.   Med Reconcile: Reviewed and verified all current medications with the patient. The updated medication list was printed and given to the patient.  New Medication: Patient was educated regarding newly prescribed medication, including discussion of  the indication, administration, side effects, and when to report to MD or RN. Patient demonstrated understanding of this information and agreed to call with further questions or concerns.  Return Appointment: Patient given instructions regarding scheduling next clinic visit. Patient demonstrated understanding of this information and agreed to call with further questions or concerns.  Patient stated he understood all health information given and agreed to call with further questions or concerns.

## 2019-12-10 ENCOUNTER — OFFICE VISIT (OUTPATIENT)
Dept: FAMILY MEDICINE | Facility: CLINIC | Age: 39
End: 2019-12-10
Payer: COMMERCIAL

## 2019-12-10 VITALS
HEIGHT: 67 IN | OXYGEN SATURATION: 98 % | SYSTOLIC BLOOD PRESSURE: 119 MMHG | WEIGHT: 200.5 LBS | BODY MASS INDEX: 31.47 KG/M2 | DIASTOLIC BLOOD PRESSURE: 79 MMHG | TEMPERATURE: 97.2 F | HEART RATE: 75 BPM

## 2019-12-10 DIAGNOSIS — H65.91 RIGHT NON-SUPPURATIVE OTITIS MEDIA: Primary | ICD-10-CM

## 2019-12-10 PROCEDURE — 99213 OFFICE O/P EST LOW 20 MIN: CPT | Performed by: NURSE PRACTITIONER

## 2019-12-10 RX ORDER — AZITHROMYCIN 250 MG/1
TABLET, FILM COATED ORAL
Qty: 6 TABLET | Refills: 0 | Status: SHIPPED | OUTPATIENT
Start: 2019-12-10 | End: 2021-02-18

## 2019-12-10 ASSESSMENT — MIFFLIN-ST. JEOR: SCORE: 1783.09

## 2019-12-10 NOTE — PROGRESS NOTES
Subjective     Denver Goff is a 39 year old male who presents to clinic today for the following health issues:    HPI   Ear Pain (Rt)       Duration: Since 2011 has had 4 episodes of ear pain,, last one 2016.  Affects both ears .  Has been rxd medication, has decreased hearing when occurring.  Feels pressure    Lasts 3-7 days    Description (location/character/radiation): Right ear pain today    Intensity:  mild now- last night was moderate     Accompanying signs and symptoms: Pressure, dry throat, last night had fever, body ache, low energy, fatigue    History (similar episodes/previous evaluation): Yes - since 2011, started with Left ear     Precipitating or alleviating factors: None    Therapies tried and outcome: Ibuprofen - somewhat improved the fever     Patient Active Problem List   Diagnosis     Renal colic     Past Surgical History:   Procedure Laterality Date     CYSTOSCOPY, RETROGRADES, EXTRACT STONE, INSERT STENT, COMBINED Left 7/24/2019    Procedure: Cystoscopy, left retrograde pyelogram, interpretation of fluoroscopic images, left ureteroscopy with stone basketing, placement of 5 x 26 double-J left ureteral stent, laser on stand-by;  Surgeon: Gavino Delarosa MD;  Location:  OR       Social History     Tobacco Use     Smoking status: Former Smoker     Types: Hookah     Smokeless tobacco: Never Used   Substance Use Topics     Alcohol use: Not Currently     Family History   Problem Relation Age of Onset     Hypertension Mother      Hyperlipidemia Father      Lung Cancer Father          Current Outpatient Medications   Medication Sig Dispense Refill     rosuvastatin (CRESTOR) 10 MG tablet Take 1 tablet (10 mg) by mouth daily 30 tablet 11     Allergies   Allergen Reactions     No Known Allergies        Reviewed and updated as needed this visit by Provider         Review of Systems   ROS COMP: Constitutional, HEENT, cardiovascular, pulmonary, gi and gu systems are negative, except as  "otherwise noted.      Objective    /79 (BP Location: Left arm, Patient Position: Sitting, Cuff Size: Adult Regular)   Pulse 75   Temp 97.2  F (36.2  C) (Tympanic)   Ht 1.702 m (5' 7\")   Wt 90.9 kg (200 lb 8 oz)   SpO2 98%   BMI 31.40 kg/m    Body mass index is 31.4 kg/m .  Physical Exam   GENERAL: healthy, alert and no distress  EYES: Eyes grossly normal to inspection, PERRL and conjunctivae and sclerae normal  HENT: ear canals normal , and left TM's normal, right TM diffusely lightly erythematous with fluid behind TM, nose and mouth without ulcers or lesions  NECK: no adenopathy, no asymmetry, masses, or scars and thyroid normal to palpation  RESP: lungs clear to auscultation - no rales, rhonchi or wheezes  CV: regular rate and rhythm, normal S1 S2, no S3 or S4, no murmur, click or rub, no peripheral edema and peripheral pulses strong    Diagnostic Test Results:  Labs reviewed in Epic        Assessment & Plan       ICD-10-CM    1. Right non-suppurative otitis media H65.91         Patient Instructions   In addition to the antibiotic   Please begin taking plain mucinex 1200mg twice a day  Use flonase 2 sprays each nostril once a day  And take claritin 10mg once a day and stay on this after this infection          DEVYN Crum [facility]    "

## 2019-12-10 NOTE — PATIENT INSTRUCTIONS
In addition to the antibiotic   Please begin taking plain mucinex 1200mg twice a day  Use flonase 2 sprays each nostril once a day  And take claritin 10mg once a day and stay on this after this infection

## 2020-02-22 NOTE — LETTER
[address]  [address]  [place]  [address]  Tel: [phone]    September 6, 2019    Denver IBLLY [address] [place] [id number]        Dear Mr. Goff,    The results of your recent labs were within normal limits except for mild hyperlipidemia, I advise diet and exercise. No change in meds. .          If you have any further questions or problems, please contact our office.      Sincerely,    Randy Riena MD          Enclosure: Lab Results  Results for orders placed or performed in visit on 09/05/19   Lipid panel reflex to direct LDL Fasting   Result Value Ref Range    Cholesterol 252 (H) <200 mg/dL    Triglycerides 118 <150 mg/dL    HDL Cholesterol 48 >39 mg/dL    LDL Cholesterol Calculated 180 (H) <100 mg/dL    Non HDL Cholesterol 204 (H) <130 mg/dL   CBC with platelets and differential   Result Value Ref Range    WBC 7.0 4.0 - 11.0 10e9/L    RBC Count 4.81 4.4 - 5.9 10e12/L    Hemoglobin 14.9 13.3 - 17.7 g/dL    Hematocrit 42.9 40.0 - 53.0 %    MCV 89 78 - 100 fl    MCH 31.0 26.5 - 33.0 pg    MCHC 34.7 31.5 - 36.5 g/dL    RDW 14.5 10.0 - 15.0 %    Platelet Count 289 150 - 450 10e9/L    % Neutrophils 49.0 %    % Lymphocytes 40.5 %    % Monocytes 9.2 %    % Eosinophils 1.0 %    % Basophils 0.3 %    Absolute Neutrophil 3.4 1.6 - 8.3 10e9/L    Absolute Lymphocytes 2.8 0.8 - 5.3 10e9/L    Absolute Monocytes 0.6 0.0 - 1.3 10e9/L    Absolute Eosinophils 0.1 0.0 - 0.7 10e9/L    Absolute Basophils 0.0 0.0 - 0.2 10e9/L    Diff Method Automated Method    Basic metabolic panel  (Ca, Cl, CO2, Creat, Gluc, K, Na, BUN)   Result Value Ref Range    Sodium 139 133 - 144 mmol/L    Potassium 4.2 3.4 - 5.3 mmol/L    Chloride 109 94 - 109 mmol/L    Carbon Dioxide 24 20 - 32 mmol/L    Anion Gap 6 3 - 14 mmol/L    Glucose 76 70 - 99 mg/dL    Urea Nitrogen 13 7 - 30 mg/dL    Creatinine 0.81 0.66 - 1.25 mg/dL    GFR Estimate >90 >60 mL/min/[1.73_m2]    GFR Estimate If Black  >90 >60 mL/min/[1.73_m2]    Calcium 9.1 8.5 - 10.1 mg/dL   Hemoglobin A1c   Result Value Ref Range    Hemoglobin A1C 5.0 0 - 5.6 %        none

## 2020-08-05 ENCOUNTER — TELEPHONE (OUTPATIENT)
Dept: CARDIOLOGY | Facility: CLINIC | Age: 40
End: 2020-08-05

## 2020-08-05 NOTE — TELEPHONE ENCOUNTER
Called patient to cancel appt on 08/12 with Dr. Oliver.    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    He is not due to be seen until Nov 2020.    Pt aware of cancellation and will call pt back once schedule as been released for yoseph.

## 2020-08-25 ENCOUNTER — TELEPHONE (OUTPATIENT)
Dept: CARDIOLOGY | Facility: CLINIC | Age: 40
End: 2020-08-25

## 2020-08-25 DIAGNOSIS — Q24.8 DOUBLE-CHAMBERED RIGHT VENTRICLE: Primary | ICD-10-CM

## 2020-08-25 NOTE — TELEPHONE ENCOUNTER
Attempted to reach out to patient to schedule follow up appt in November. No answer, unable to leave VM due to mailbox not being set up yet.

## 2020-09-10 ENCOUNTER — TELEPHONE (OUTPATIENT)
Dept: CARDIOLOGY | Facility: CLINIC | Age: 40
End: 2020-09-10

## 2020-11-16 DIAGNOSIS — Q24.8 DOUBLE-CHAMBERED RIGHT VENTRICLE: Primary | ICD-10-CM

## 2021-01-03 ENCOUNTER — HEALTH MAINTENANCE LETTER (OUTPATIENT)
Age: 41
End: 2021-01-03

## 2021-02-11 ENCOUNTER — HOSPITAL ENCOUNTER (OUTPATIENT)
Dept: CARDIOLOGY | Facility: CLINIC | Age: 41
Discharge: HOME | End: 2021-02-11
Attending: INTERNAL MEDICINE | Admitting: INTERNAL MEDICINE
Payer: COMMERCIAL

## 2021-02-11 DIAGNOSIS — Q24.8 DOUBLE-CHAMBERED RIGHT VENTRICLE: ICD-10-CM

## 2021-02-11 LAB
CHOLEST SERPL-MCNC: 261 MG/DL
HDLC SERPL-MCNC: 53 MG/DL
LDLC SERPL CALC-MCNC: 166 MG/DL
NONHDLC SERPL-MCNC: 208 MG/DL
TRIGL SERPL-MCNC: 208 MG/DL

## 2021-02-11 PROCEDURE — 93303 ECHO TRANSTHORACIC: CPT | Mod: 26 | Performed by: PEDIATRICS

## 2021-02-11 PROCEDURE — 93325 DOPPLER ECHO COLOR FLOW MAPG: CPT

## 2021-02-11 PROCEDURE — 93320 DOPPLER ECHO COMPLETE: CPT | Mod: 26 | Performed by: PEDIATRICS

## 2021-02-11 PROCEDURE — 93325 DOPPLER ECHO COLOR FLOW MAPG: CPT | Mod: 26 | Performed by: PEDIATRICS

## 2021-02-11 PROCEDURE — 36415 COLL VENOUS BLD VENIPUNCTURE: CPT | Performed by: INTERNAL MEDICINE

## 2021-02-11 PROCEDURE — 80061 LIPID PANEL: CPT | Performed by: INTERNAL MEDICINE

## 2021-02-18 ENCOUNTER — OFFICE VISIT (OUTPATIENT)
Dept: CARDIOLOGY | Facility: CLINIC | Age: 41
End: 2021-02-18
Payer: COMMERCIAL

## 2021-02-18 VITALS
DIASTOLIC BLOOD PRESSURE: 72 MMHG | OXYGEN SATURATION: 98 % | HEART RATE: 93 BPM | HEIGHT: 67 IN | WEIGHT: 212.1 LBS | BODY MASS INDEX: 33.29 KG/M2 | SYSTOLIC BLOOD PRESSURE: 108 MMHG

## 2021-02-18 DIAGNOSIS — E78.5 HYPERLIPIDEMIA LDL GOAL <160: ICD-10-CM

## 2021-02-18 DIAGNOSIS — Q24.8 DOUBLE-CHAMBERED RIGHT VENTRICLE: ICD-10-CM

## 2021-02-18 PROCEDURE — 99213 OFFICE O/P EST LOW 20 MIN: CPT | Performed by: INTERNAL MEDICINE

## 2021-02-18 RX ORDER — ROSUVASTATIN CALCIUM 10 MG/1
10 TABLET, COATED ORAL DAILY
Qty: 90 TABLET | Refills: 3 | Status: SHIPPED | OUTPATIENT
Start: 2021-02-18 | End: 2022-05-19

## 2021-02-18 ASSESSMENT — MIFFLIN-ST. JEOR: SCORE: 1830.71

## 2021-02-18 NOTE — PROGRESS NOTES
CARDIOLOGY CONSULTATION:    I had the pleasure of seeing Denver Goff.  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 Ac); surgery was uncomplicated except for possible arrhythmia that is now resolved. He was being followed by Dr. Grigsby before moving to [place] and establishing care in our clinic 10/2019.  Denver did not report fatigue, exercise intolerance, diaphoresis, tachycardia, chest pain, dyspnea, syncope, dizziness, palpitations, cyanosis, edema.  He has been getting some upper back pain with walking that he thought was due to weight gain. He is working on that now.  He is off his statin as he ran out.      Family history is negative for congenital heart disease or acquired structural heart disease, but for his sister who had a child with a hole in the heart. He is single. 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 He had covid 1/2021 and did ok.  He used to smoke from age 24 until 32.     PAST MEDICAL HISTORY:  Past Medical History:   Diagnosis Date     Congenital heart anomaly        CURRENT MEDICATIONS:  No current outpatient medications on file.       PAST SURGICAL HISTORY:  Past Surgical History:   Procedure Laterality Date     CYSTOSCOPY, RETROGRADES, EXTRACT STONE, INSERT STENT, COMBINED Left 7/24/2019    Procedure: Cystoscopy, left retrograde pyelogram, interpretation of fluoroscopic images, left ureteroscopy with stone basketing, placement of 5 x 26 double-J left ureteral stent, laser on stand-by;  Surgeon: Gavino Delarosa MD;  Location: [facility] OR       ALLERGIES  No known allergies    FAMILY HX:  Family History   Problem Relation Age of Onset     Hypertension Mother      Hyperlipidemia Father      Lung Cancer Father        SOCIAL HX:  Social History     Socioeconomic History     Marital status: Single     Spouse name: None     Number of children: None     Years of education: None  "    Highest education level: None   Occupational History     None   Social Needs     Financial resource strain: None     Food insecurity     Worry: None     Inability: None     Transportation needs     Medical: None     Non-medical: None   Tobacco Use     Smoking status: Former Smoker     Types: Hookah     Smokeless tobacco: Never Used   Substance and Sexual Activity     Alcohol use: Not Currently     Drug use: Never     Sexual activity: Yes     Partners: Female   Lifestyle     Physical activity     Days per week: None     Minutes per session: None     Stress: None   Relationships     Social connections     Talks on phone: None     Gets together: None     Attends Yazdanism service: None     Active member of club or organization: None     Attends meetings of clubs or organizations: None     Relationship status: None     Intimate partner violence     Fear of current or ex partner: None     Emotionally abused: None     Physically abused: None     Forced sexual activity: None   Other Topics Concern     Parent/sibling w/ CABG, MI or angioplasty before 65F 55M? Not Asked   Social History Narrative     None       ROS:  Constitutional: No fever, chills, or sweats. No weight gain/loss.   ENT: No visual disturbance, ear ache, epistaxis, sore throat.   Allergies/Immunologic: Negative.   Respiratory: No cough, hemoptysis.   Cardiovascular: As per HPI.   GI: No nausea, vomiting, hematemesis, melena, or hematochezia.   : No urinary frequency, dysuria, or hematuria.   Integument: Negative.   Psychiatric: Negative.   Neuro: Negative.   Endocrinology: Negative.   Musculoskeletal: No myalgia.    VITAL SIGNS:  /72   Pulse 93   Ht 1.702 m (5' 7\")   Wt 96.2 kg (212 lb 1.6 oz)   SpO2 98%   BMI 33.22 kg/m    Body mass index is 33.22 kg/m .  Wt Readings from Last 2 Encounters:   02/18/21 96.2 kg (212 lb 1.6 oz)   12/10/19 90.9 kg (200 lb 8 oz)       PHYSICAL EXAM  Denver Goff IS A 40 year old male.in no acute " distress.  HEENT: Unremarkable.  Neck: JVP normal.  Carotids +4/4 bilaterally without bruits.  Lungs: CTA.  Cor: RRR. Normal S1 and S2.  No murmur, rub, or gallop.  PMI in Lf 5th ICS.  Abd: Soft, nontender, nondistended.    Extremities: No C/C/E.  Pulses +4/4 symmetric in upper and lower extremities.  Neuro: Grossly intact.    LABS    Lab Results   Component Value Date    WBC 7.0 09/05/2019     Lab Results   Component Value Date    RBC 4.81 09/05/2019     Lab Results   Component Value Date    HGB 14.9 09/05/2019     Lab Results   Component Value Date    HCT 42.9 09/05/2019     No components found for: MCT  Lab Results   Component Value Date    MCV 89 09/05/2019     Lab Results   Component Value Date    MCH 31.0 09/05/2019     Lab Results   Component Value Date    MCHC 34.7 09/05/2019     Lab Results   Component Value Date    RDW 14.5 09/05/2019     Lab Results   Component Value Date     09/05/2019      Recent Labs   Lab Test 09/05/19  1545 07/24/19  0619    137   POTASSIUM 4.2 3.8   CHLORIDE 109 105   CO2 24 27   ANIONGAP 6 5   GLC 76 102*   BUN 13 15   CR 0.81 1.27*   LUIGI 9.1 8.3*     Recent Labs   Lab Test 02/11/21  0828 11/11/19  0915   CHOL 261* 171   HDL 53 55   * 93   TRIG 208* 114   NHDL 208* 116      1) Double chamber RV, 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 He has been doing well and is currently asymptomatic with essentially normal echocardiogram. Of note, his lipid panel showed elevated LDL at 180mg/dL - this is because he ran out of Crestor 10mg daily that we started last year.    With his back pain with exertion, will be sure that we are not missing coronary disease, and at the same time get a baseline CPX.      Thank you for the opportunity to meet Denver. Will plan to see him in a year with echo and lipids.  Please don't hesitate to contact me with questions or concerns.    LUANNE Oliver MD     20 minutes face-face and documentation and review on day of visit.

## 2021-02-18 NOTE — LETTER
2/18/2021    Physician No Ref-Primary  No address on file    RE: Denvre Goff       Dear Colleague,    I had the pleasure of seeing Denver Goff in the [facility].    CARDIOLOGY CONSULTATION:    I had the pleasure of seeing Denver Goff.  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 Ac); surgery was uncomplicated except for possible arrhythmia that is now resolved. He was being followed by Dr. Grigsby before moving to [place] and establishing care in our clinic 10/2019.  Denver did not report fatigue, exercise intolerance, diaphoresis, tachycardia, chest pain, dyspnea, syncope, dizziness, palpitations, cyanosis, edema.  He has been getting some upper back pain with walking that he thought was due to weight gain. He is working on that now.  He is off his statin as he ran out.      Family history is negative for congenital heart disease or acquired structural heart disease, but for his sister who had a child with a hole in the heart. He is single. 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  He used to smoke from age 24 until 32.     PAST MEDICAL HISTORY:  Past Medical History:   Diagnosis Date     Congenital heart anomaly        CURRENT MEDICATIONS:  No current outpatient medications on file.       PAST SURGICAL HISTORY:  Past Surgical History:   Procedure Laterality Date     CYSTOSCOPY, RETROGRADES, EXTRACT STONE, INSERT STENT, COMBINED Left 7/24/2019    Procedure: Cystoscopy, left retrograde pyelogram, interpretation of fluoroscopic images, left ureteroscopy with stone basketing, placement of 5 x 26 double-J left ureteral stent, laser on stand-by;  Surgeon: Gavino Delarosa MD;  Location: [facility] OR       ALLERGIES  No known allergies    FAMILY HX:  Family History   Problem Relation Age of Onset  "    Hypertension Mother      Hyperlipidemia Father      Lung Cancer Father        SOCIAL HX:  Social History     Socioeconomic History     Marital status: Single     Spouse name: None     Number of children: None     Years of education: None     Highest education level: None   Occupational History     None   Social Needs     Financial resource strain: None     Food insecurity     Worry: None     Inability: None     Transportation needs     Medical: None     Non-medical: None   Tobacco Use     Smoking status: Former Smoker     Types: Hookah     Smokeless tobacco: Never Used   Substance and Sexual Activity     Alcohol use: Not Currently     Drug use: Never     Sexual activity: Yes     Partners: Female   Lifestyle     Physical activity     Days per week: None     Minutes per session: None     Stress: None   Relationships     Social connections     Talks on phone: None     Gets together: None     Attends Hinduism service: None     Active member of club or organization: None     Attends meetings of clubs or organizations: None     Relationship status: None     Intimate partner violence     Fear of current or ex partner: None     Emotionally abused: None     Physically abused: None     Forced sexual activity: None   Other Topics Concern     Parent/sibling w/ CABG, MI or angioplasty before 65F 55M? Not Asked   Social History Narrative     None       ROS:  Constitutional: No fever, chills, or sweats. No weight gain/loss.   ENT: No visual disturbance, ear ache, epistaxis, sore throat.   Allergies/Immunologic: Negative.   Respiratory: No cough, hemoptysis.   Cardiovascular: As per HPI.   GI: No nausea, vomiting, hematemesis, melena, or hematochezia.   : No urinary frequency, dysuria, or hematuria.   Integument: Negative.   Psychiatric: Negative.   Neuro: Negative.   Endocrinology: Negative.   Musculoskeletal: No myalgia.    VITAL SIGNS:  /72   Pulse 93   Ht 1.702 m (5' 7\")   Wt 96.2 kg (212 lb 1.6 oz)   SpO2 98%  "  BMI 33.22 kg/m    Body mass index is 33.22 kg/m .  Wt Readings from Last 2 Encounters:   02/18/21 96.2 kg (212 lb 1.6 oz)   12/10/19 90.9 kg (200 lb 8 oz)       PHYSICAL EXAM  Denver Goff IS A 40 year old male.in no acute distress.  HEENT: Unremarkable.  Neck: JVP normal.  Carotids +4/4 bilaterally without bruits.  Lungs: CTA.  Cor: RRR. Normal S1 and S2.  No murmur, rub, or gallop.  PMI in Lf 5th ICS.  Abd: Soft, nontender, nondistended.    Extremities: No C/C/E.  Pulses +4/4 symmetric in upper and lower extremities.  Neuro: Grossly intact.    LABS    Lab Results   Component Value Date    WBC 7.0 09/05/2019     Lab Results   Component Value Date    RBC 4.81 09/05/2019     Lab Results   Component Value Date    HGB 14.9 09/05/2019     Lab Results   Component Value Date    HCT 42.9 09/05/2019     No components found for: MCT  Lab Results   Component Value Date    MCV 89 09/05/2019     Lab Results   Component Value Date    MCH 31.0 09/05/2019     Lab Results   Component Value Date    MCHC 34.7 09/05/2019     Lab Results   Component Value Date    RDW 14.5 09/05/2019     Lab Results   Component Value Date     09/05/2019      Recent Labs   Lab Test 09/05/19  1545 07/24/19  0619    137   POTASSIUM 4.2 3.8   CHLORIDE 109 105   CO2 24 27   ANIONGAP 6 5   GLC 76 102*   BUN 13 15   CR 0.81 1.27*   LUIGI 9.1 8.3*     Recent Labs   Lab Test 02/11/21  0828 11/11/19  0915   CHOL 261* 171   HDL 53 55   * 93   TRIG 208* 114   NHDL 208* 116      1) Double chamber RV, 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 He has been doing well and is currently asymptomatic with essentially normal echocardiogram. Of note, his lipid panel showed elevated LDL at 180mg/dL - this is because he ran out of Crestor 10mg daily that we started last year.    With  his back pain with exertion, will be sure that we are not missing coronary disease, and at the same time get a baseline CPX.      Thank you for the opportunity to meet Denver. Will plan to see him in a year with echo and lipids. Please don't hesitate to contact me with questions or concerns.    LUANNE Oliver MD     20 minutes face-face and documentation and review on day of visit.      Thank you for allowing me to participate in the care of your patient.      Sincerely,     Ronaldo Oliver MD     [facility]  cc:   Tessy Reina MD  [address] [address]

## 2021-02-18 NOTE — LETTER
2/18/2021    Tessy Reina MD  [address] [facility] [address]  [place] [address]      RE: Denver Goff       Dear Colleague,    I had the pleasure of seeing Denver Goff in the [facility].    CARDIOLOGY CONSULTATION:    I had the pleasure of seeing Denver Goff.  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 Ac); surgery was uncomplicated except for possible arrhythmia that is now resolved. He was being followed by Dr. Grigsby before moving to [place] and establishing care in our clinic 10/2019.  Denver did not report fatigue, exercise intolerance, diaphoresis, tachycardia, chest pain, dyspnea, syncope, dizziness, palpitations, cyanosis, edema.  He has been getting some upper back pain with walking that he thought was due to weight gain. He is working on that now.  He is off his statin as he ran out.      Family history is negative for congenital heart disease or acquired structural heart disease, but for his sister who had a child with a hole in the heart. He is single. 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  He used to smoke from age 24 until 32.     PAST MEDICAL HISTORY:  Past Medical History:   Diagnosis Date     Congenital heart anomaly        CURRENT MEDICATIONS:  No current outpatient medications on file.       PAST SURGICAL HISTORY:  Past Surgical History:   Procedure Laterality Date     CYSTOSCOPY, RETROGRADES, EXTRACT STONE, INSERT STENT, COMBINED Left 7/24/2019    Procedure: Cystoscopy, left retrograde pyelogram, interpretation of fluoroscopic images, left ureteroscopy with stone basketing, placement of 5 x 26 double-J left ureteral stent, laser on stand-by;  Surgeon: Gavino Delarosa MD;  Location: [facility] OR       ALLERGIES  No known allergies    FAMILY HX:  Family History   Problem  "Relation Age of Onset     Hypertension Mother      Hyperlipidemia Father      Lung Cancer Father        SOCIAL HX:  Social History     Socioeconomic History     Marital status: Single     Spouse name: None     Number of children: None     Years of education: None     Highest education level: None   Occupational History     None   Social Needs     Financial resource strain: None     Food insecurity     Worry: None     Inability: None     Transportation needs     Medical: None     Non-medical: None   Tobacco Use     Smoking status: Former Smoker     Types: Hookah     Smokeless tobacco: Never Used   Substance and Sexual Activity     Alcohol use: Not Currently     Drug use: Never     Sexual activity: Yes     Partners: Female   Lifestyle     Physical activity     Days per week: None     Minutes per session: None     Stress: None   Relationships     Social connections     Talks on phone: None     Gets together: None     Attends Pentecostalism service: None     Active member of club or organization: None     Attends meetings of clubs or organizations: None     Relationship status: None     Intimate partner violence     Fear of current or ex partner: None     Emotionally abused: None     Physically abused: None     Forced sexual activity: None   Other Topics Concern     Parent/sibling w/ CABG, MI or angioplasty before 65F 55M? Not Asked   Social History Narrative     None       ROS:  Constitutional: No fever, chills, or sweats. No weight gain/loss.   ENT: No visual disturbance, ear ache, epistaxis, sore throat.   Allergies/Immunologic: Negative.   Respiratory: No cough, hemoptysis.   Cardiovascular: As per HPI.   GI: No nausea, vomiting, hematemesis, melena, or hematochezia.   : No urinary frequency, dysuria, or hematuria.   Integument: Negative.   Psychiatric: Negative.   Neuro: Negative.   Endocrinology: Negative.   Musculoskeletal: No myalgia.    VITAL SIGNS:  /72   Pulse 93   Ht 1.702 m (5' 7\")   Wt 96.2 kg (212 " lb 1.6 oz)   SpO2 98%   BMI 33.22 kg/m    Body mass index is 33.22 kg/m .  Wt Readings from Last 2 Encounters:   02/18/21 96.2 kg (212 lb 1.6 oz)   12/10/19 90.9 kg (200 lb 8 oz)       PHYSICAL EXAM  Denver Goff IS A 40 year old male.in no acute distress.  HEENT: Unremarkable.  Neck: JVP normal.  Carotids +4/4 bilaterally without bruits.  Lungs: CTA.  Cor: RRR. Normal S1 and S2.  No murmur, rub, or gallop.  PMI in Lf 5th ICS.  Abd: Soft, nontender, nondistended.    Extremities: No C/C/E.  Pulses +4/4 symmetric in upper and lower extremities.  Neuro: Grossly intact.    LABS    Lab Results   Component Value Date    WBC 7.0 09/05/2019     Lab Results   Component Value Date    RBC 4.81 09/05/2019     Lab Results   Component Value Date    HGB 14.9 09/05/2019     Lab Results   Component Value Date    HCT 42.9 09/05/2019     No components found for: MCT  Lab Results   Component Value Date    MCV 89 09/05/2019     Lab Results   Component Value Date    MCH 31.0 09/05/2019     Lab Results   Component Value Date    MCHC 34.7 09/05/2019     Lab Results   Component Value Date    RDW 14.5 09/05/2019     Lab Results   Component Value Date     09/05/2019      Recent Labs   Lab Test 09/05/19  1545 07/24/19  0619    137   POTASSIUM 4.2 3.8   CHLORIDE 109 105   CO2 24 27   ANIONGAP 6 5   GLC 76 102*   BUN 13 15   CR 0.81 1.27*   LUIGI 9.1 8.3*     Recent Labs   Lab Test 02/11/21  0828 11/11/19  0915   CHOL 261* 171   HDL 53 55   * 93   TRIG 208* 114   NHDL 208* 116      1) Double chamber RV, 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 He has been doing well and is currently asymptomatic with essentially normal echocardiogram. Of note, his lipid panel showed elevated LDL at 180mg/dL - this is because he ran out of Crestor 10mg daily that we  started last year.    With his back pain with exertion, will be sure that we are not missing coronary disease, and at the same time get a baseline CPX.      Thank you for the opportunity to meet Denver. Will plan to see him in a year with echo and lipids. Please don't hesitate to contact me with questions or concerns.    LUANNE Oliver MD     20 minutes face-face and documentation and review on day of visit.      Thank you for allowing me to participate in the care of your patient.    Sincerely,     Ronaldo Oliver MD     [facility]

## 2021-02-18 NOTE — NURSING NOTE
Cardiac Testing: Patient given instructions regarding cardiopulmonary exercise stress test and echocardiogram . Discussed purpose, preparation, procedure and when to expect results reported back to the patient. Patient demonstrated understanding of this information and agreed to call with further questions or concerns.    Diet: Patient instructed regarding a heart healthy diet, including discussion of reduced fat and sodium intake. Patient demonstrated understanding of this information and agreed to call with further questions or concerns.    Med Reconcile: Reviewed and verified all current medications with the patient. The updated medication list was printed and given to the patient.    Return Appointment: Patient given instructions regarding scheduling next clinic visit. Patient demonstrated understanding of this information and agreed to call with further questions or concerns.    Patient stated he understood all health information given and agreed to call with further questions or concerns.    Doreen Castle, MSN, RN, CNL  Cardiology Care Coordinator  [facility]  [phone]

## 2021-02-18 NOTE — PATIENT INSTRUCTIONS
You were seen today in the Adult Congenital and Cardiovascular Genetics Clinic at the [facility].    Cardiology Providers you saw during your visit:  LUANNE Oliver MD    Diagnosis:  Double-chambered right ventricle    Results:  LUANNE Oliver MD reviewed the results of your labs and echo testing today in clinic.    Recommendations:    1. Continue to eat a heart healthy, low salt diet.  2. Continue to get 20-30 minutes of aerobic activity, 4-5 days per week.  Examples of aerobic activity include walking, running, swimming, cycling, etc.  3. Continue to observe good oral hygiene, with regular dental visits.  4. We refilled your Crestor 10 mg daily.  5. Please have a CPX.    CardioPulmonary Stress Test (CPX)  CPX is a maximal (meaning you exercise to exhaustion, not to achieve a heart rate) exercise test where we measure how well your heart/body uses oxygen or energy. It is the gold standard for measuring functional capacity and helps us differentiate limitations due to lungs, heart, or fitness.   A CPX is NOT a typical stress test. You will NOT be asked to hold your Beta Blocker medication.   You will be scheduled for a CardioPulmonary Stress Test at the [facility] ([address], [facility] [address], [phone]).    Follow these instructions:    1. Report to the GOLD waiting room in the main hospital.  2. Nothing to eat for 3 hours prior to your test. You may have clear liquids up to the time of your test  3. Please wear loose, two-piece clothing and comfortable, rubber soled shoes for walking     For Patients with Diabetes: Your RN Coordinator will give you instructions on adjusting your diabetic medications for the day of your test                           If you have questions please contact your diabetic care team                           Remember to  bring your glucometer & insulin with you to take after your test if needed    SBE prophylaxis:   Yes____   No_X___    Lifelong Bacterial Endocarditis Prophylaxis:  YES____  NO____    If YES is checked, follow the recommendations outlined below:  1. Take antibiotic(s) prior to recommended dental procedures and procedures on the respiratory tract or with infected skin, muscle or bones. SBE prophylaxis is not needed for routine GI and  procedures (ie. Colonoscopy or vaginal delivery)  2. Observe good oral hygiene daily, as advised by your dentist. Get regular professional dental care.  3. Keep cuts clean.  4. Infections should be treated promptly.  5. Symptoms of Infective Endocarditis could include: fever lasting more than 4-5 days or a recurrent fever that initially resolves but returns within 1-2 days)      Exercise restrictions:   Yes__X__  No____         If yes, list restrictions:  Must be allowed to rest if fatigued or SOB      Work restrictions:  Yes____  No_X___         If yes, list restrictions:    FASTING CHOLESTEROL was checked in the last 5 years YES_X__  NO___ (2021)  Continue to eat a heart healthy, low salt diet.         ____ Fasting lipid panel order today         ____ No changes in medications          ____ I recommend the following changes in your cholesterol medications.:          ____ Please follow up for cholesterol screening at your primary care physician      Follow-up:  Follow up with Dr. Oliver in one year with echo and fasting lipids prior.     If you have questions or concerns please contact us at:    Doreen Castle, MSN, RN, CNL    Lorena Awad (Scheduling)  Nurse Care Coordinator     Clinic   Adult Congenital and CV Genetics   Adult Congenital and CV Genetics  [facility]   [facility]  (P) [phone]     (P) [phone]  [email or web address]   (F) [phone]        For after hours urgent needs, call [phone] and ask to speak to the Adult Congenital Physician on call.  Mention Job Code 0401.    For  emergencies call 911.    [facility]  [facility]  Mail Code [id number]  [address] [place]  [address]

## 2021-03-08 ENCOUNTER — HOSPITAL ENCOUNTER (OUTPATIENT)
Dept: CARDIOLOGY | Facility: CLINIC | Age: 41
Discharge: HOME | End: 2021-03-08
Attending: INTERNAL MEDICINE | Admitting: INTERNAL MEDICINE
Payer: COMMERCIAL

## 2021-03-08 DIAGNOSIS — Q24.8 DOUBLE-CHAMBERED RIGHT VENTRICLE: ICD-10-CM

## 2021-03-08 DIAGNOSIS — E78.5 HYPERLIPIDEMIA LDL GOAL <160: ICD-10-CM

## 2021-03-08 PROCEDURE — 94621 CARDIOPULM EXERCISE TESTING: CPT | Mod: 26 | Performed by: INTERNAL MEDICINE

## 2021-03-08 PROCEDURE — 94621 CARDIOPULM EXERCISE TESTING: CPT

## 2021-03-12 ENCOUNTER — OFFICE VISIT (OUTPATIENT)
Dept: FAMILY MEDICINE | Facility: CLINIC | Age: 41
End: 2021-03-12
Payer: COMMERCIAL

## 2021-03-12 VITALS
DIASTOLIC BLOOD PRESSURE: 80 MMHG | BODY MASS INDEX: 32.79 KG/M2 | HEART RATE: 73 BPM | WEIGHT: 209.38 LBS | SYSTOLIC BLOOD PRESSURE: 118 MMHG | TEMPERATURE: 98 F

## 2021-03-12 DIAGNOSIS — R53.83 FATIGUE, UNSPECIFIED TYPE: ICD-10-CM

## 2021-03-12 DIAGNOSIS — E66.9 OBESITY, UNSPECIFIED CLASSIFICATION, UNSPECIFIED OBESITY TYPE, UNSPECIFIED WHETHER SERIOUS COMORBIDITY PRESENT: ICD-10-CM

## 2021-03-12 DIAGNOSIS — Z87.74 HISTORY OF CONGENITAL HEART DEFECT: ICD-10-CM

## 2021-03-12 DIAGNOSIS — Z12.5 SCREENING FOR PROSTATE CANCER: ICD-10-CM

## 2021-03-12 DIAGNOSIS — Z13.1 SCREENING FOR DIABETES MELLITUS: ICD-10-CM

## 2021-03-12 DIAGNOSIS — E55.9 VITAMIN D DEFICIENCY DISEASE: ICD-10-CM

## 2021-03-12 DIAGNOSIS — E78.5 HYPERLIPIDEMIA LDL GOAL <100: Primary | ICD-10-CM

## 2021-03-12 LAB
ALBUMIN SERPL-MCNC: 4 G/DL (ref 3.4–5)
ALP SERPL-CCNC: 105 U/L (ref 40–150)
ALT SERPL W P-5'-P-CCNC: 43 U/L (ref 0–70)
ANION GAP SERPL CALCULATED.3IONS-SCNC: 3 MMOL/L (ref 3–14)
AST SERPL W P-5'-P-CCNC: 31 U/L (ref 0–45)
BASOPHILS # BLD AUTO: 0 10E9/L (ref 0–0.2)
BASOPHILS NFR BLD AUTO: 0.4 %
BILIRUB SERPL-MCNC: 0.4 MG/DL (ref 0.2–1.3)
BUN SERPL-MCNC: 15 MG/DL (ref 7–30)
CALCIUM SERPL-MCNC: 9.4 MG/DL (ref 8.5–10.1)
CHLORIDE SERPL-SCNC: 107 MMOL/L (ref 94–109)
CHOLEST SERPL-MCNC: 147 MG/DL
CK SERPL-CCNC: 143 U/L (ref 30–300)
CO2 SERPL-SCNC: 28 MMOL/L (ref 20–32)
CREAT SERPL-MCNC: 0.85 MG/DL (ref 0.66–1.25)
DEPRECATED CALCIDIOL+CALCIFEROL SERPL-MC: 62 UG/L (ref 20–75)
DIFFERENTIAL METHOD BLD: NORMAL
EOSINOPHIL # BLD AUTO: 0.2 10E9/L (ref 0–0.7)
EOSINOPHIL NFR BLD AUTO: 3.8 %
ERYTHROCYTE [DISTWIDTH] IN BLOOD BY AUTOMATED COUNT: 14.9 % (ref 10–15)
GFR SERPL CREATININE-BSD FRML MDRD: >90 ML/MIN/{1.73_M2}
GLUCOSE SERPL-MCNC: 80 MG/DL (ref 70–99)
HBA1C MFR BLD: 5.5 % (ref 0–5.6)
HCT VFR BLD AUTO: 45.6 % (ref 40–53)
HDLC SERPL-MCNC: 47 MG/DL
HGB BLD-MCNC: 15.5 G/DL (ref 13.3–17.7)
LDLC SERPL CALC-MCNC: 70 MG/DL
LYMPHOCYTES # BLD AUTO: 2.5 10E9/L (ref 0.8–5.3)
LYMPHOCYTES NFR BLD AUTO: 45.6 %
MCH RBC QN AUTO: 29.6 PG (ref 26.5–33)
MCHC RBC AUTO-ENTMCNC: 34 G/DL (ref 31.5–36.5)
MCV RBC AUTO: 87 FL (ref 78–100)
MONOCYTES # BLD AUTO: 0.6 10E9/L (ref 0–1.3)
MONOCYTES NFR BLD AUTO: 11 %
NEUTROPHILS # BLD AUTO: 2.2 10E9/L (ref 1.6–8.3)
NEUTROPHILS NFR BLD AUTO: 39.2 %
NONHDLC SERPL-MCNC: 100 MG/DL
PLATELET # BLD AUTO: 240 10E9/L (ref 150–450)
POTASSIUM SERPL-SCNC: 4.3 MMOL/L (ref 3.4–5.3)
PROT SERPL-MCNC: 7.9 G/DL (ref 6.8–8.8)
PSA SERPL-ACNC: 1.21 UG/L (ref 0–4)
RBC # BLD AUTO: 5.23 10E12/L (ref 4.4–5.9)
SODIUM SERPL-SCNC: 138 MMOL/L (ref 133–144)
TRIGL SERPL-MCNC: 149 MG/DL
TSH SERPL DL<=0.005 MIU/L-ACNC: 1.2 MU/L (ref 0.4–4)
WBC # BLD AUTO: 5.6 10E9/L (ref 4–11)

## 2021-03-12 PROCEDURE — 36415 COLL VENOUS BLD VENIPUNCTURE: CPT | Performed by: FAMILY MEDICINE

## 2021-03-12 PROCEDURE — 82306 VITAMIN D 25 HYDROXY: CPT | Performed by: FAMILY MEDICINE

## 2021-03-12 PROCEDURE — 80061 LIPID PANEL: CPT | Performed by: FAMILY MEDICINE

## 2021-03-12 PROCEDURE — 80050 GENERAL HEALTH PANEL: CPT | Performed by: FAMILY MEDICINE

## 2021-03-12 PROCEDURE — 83036 HEMOGLOBIN GLYCOSYLATED A1C: CPT | Performed by: FAMILY MEDICINE

## 2021-03-12 PROCEDURE — G0103 PSA SCREENING: HCPCS | Performed by: FAMILY MEDICINE

## 2021-03-12 PROCEDURE — 99213 OFFICE O/P EST LOW 20 MIN: CPT | Performed by: FAMILY MEDICINE

## 2021-03-12 PROCEDURE — 82550 ASSAY OF CK (CPK): CPT | Performed by: FAMILY MEDICINE

## 2021-03-12 SDOH — ECONOMIC STABILITY: FOOD INSECURITY: WITHIN THE PAST 12 MONTHS, THE FOOD YOU BOUGHT JUST DIDN'T LAST AND YOU DIDN'T HAVE MONEY TO GET MORE.: NOT ASKED

## 2021-03-12 SDOH — ECONOMIC STABILITY: TRANSPORTATION INSECURITY
IN THE PAST 12 MONTHS, HAS LACK OF TRANSPORTATION KEPT YOU FROM MEETINGS, WORK, OR FROM GETTING THINGS NEEDED FOR DAILY LIVING?: NOT ASKED

## 2021-03-12 SDOH — ECONOMIC STABILITY: FOOD INSECURITY: WITHIN THE PAST 12 MONTHS, YOU WORRIED THAT YOUR FOOD WOULD RUN OUT BEFORE YOU GOT MONEY TO BUY MORE.: NOT ASKED

## 2021-03-12 SDOH — ECONOMIC STABILITY: TRANSPORTATION INSECURITY
IN THE PAST 12 MONTHS, HAS THE LACK OF TRANSPORTATION KEPT YOU FROM MEDICAL APPOINTMENTS OR FROM GETTING MEDICATIONS?: NOT ASKED

## 2021-03-12 SDOH — ECONOMIC STABILITY: INCOME INSECURITY: HOW HARD IS IT FOR YOU TO PAY FOR THE VERY BASICS LIKE FOOD, HOUSING, MEDICAL CARE, AND HEATING?: NOT ASKED

## 2021-03-12 NOTE — PROGRESS NOTES
Subjective   Denver is a 40 year old who presents for the following health issues    HPI       Establish care       Review of Systems   Born in [facility]    Born with double chambered right ventricle  Was fixed at age 28    Good result since then  That was only defect    Only med is chol pill    Breathing fine    No chest pain    Started exercising some    Trying to lose wt    Cardio    Work with patients with disabilities  Mostly office work    Quit smoking    Started healthy diet    Wondering about blood sugar  Prediabetic in past     Dad had diabetes late in life    Did have mild covid in Jan    Also pos in Feb    Patient did have J and J vaccine through work          Objective    /80 (BP Location: Right arm, Patient Position: Chair, Cuff Size: Adult Regular)   Pulse 73   Temp 98  F (36.7  C) (Oral)   Wt 95 kg (209 lb 6 oz)   BMI 32.79 kg/m    Body mass index is 32.79 kg/m .  Physical Exam  Constitutional:       Appearance: He is well-developed.   HENT:      Head: Normocephalic and atraumatic.   Eyes:      Conjunctiva/sclera: Conjunctivae normal.   Neck:      Vascular: No carotid bruit.   Cardiovascular:      Rate and Rhythm: Normal rate and regular rhythm.      Heart sounds: Normal heart sounds.   Pulmonary:      Effort: Pulmonary effort is normal. No respiratory distress.      Breath sounds: Normal breath sounds.   Abdominal:      Palpations: Abdomen is soft.      Tenderness: There is no abdominal tenderness.   Neurological:      Mental Status: He is alert and oriented to person, place, and time.      Cranial Nerves: No cranial nerve deficit.   Psychiatric:         Speech: Speech normal.         Behavior: Behavior normal.              ASSESSMENT / PLAN:  (E78.5) Hyperlipidemia LDL goal <100  (primary encounter diagnosis)  Comment: patient has been on statin for several weeks now  Plan: Lipid panel reflex to direct LDL Non-fasting,         Comprehensive metabolic panel, CK total        Check labs      (Z87.74) History of congenital heart defect  Comment: s/p surgical repair 12 years ago  Plan: follows with cardiology     (E66.9) Obesity, unspecified classification, unspecified obesity type, unspecified whether serious comorbidity present  Comment: discussed   Plan: keep working on healthy diet/exercise and wt loss    (Z12.5) Screening for prostate cancer  Comment: check   Plan: Prostate spec antigen screen             (Z13.1) Screening for diabetes mellitus  Comment: check; prediabetes in past apparently   Plan: Hemoglobin A1c             (R53.83) Fatigue, unspecified type  Comment: check   Plan: TSH with free T4 reflex, CBC with platelets         differential             (E55.9) Vitamin D deficiency disease  Comment: check; in [place] was on supplement, not currently   Plan: Vitamin D Deficiency               I reviewed the patient's medications, allergies, medical history, family history, and social history.    Porter Pritchett MD

## 2021-03-12 NOTE — PATIENT INSTRUCTIONS
We will send you lab results    Keep working on healthy diet/exercise and weight loss    Call/ return to clinic with problems/ questions

## 2021-04-30 ENCOUNTER — OFFICE VISIT (OUTPATIENT)
Dept: FAMILY MEDICINE | Facility: CLINIC | Age: 41
End: 2021-04-30
Payer: COMMERCIAL

## 2021-04-30 VITALS
SYSTOLIC BLOOD PRESSURE: 115 MMHG | DIASTOLIC BLOOD PRESSURE: 84 MMHG | TEMPERATURE: 97.8 F | BODY MASS INDEX: 32.01 KG/M2 | HEART RATE: 74 BPM | WEIGHT: 204.38 LBS

## 2021-04-30 DIAGNOSIS — S86.811A STRAIN OF CALF MUSCLE, RIGHT, INITIAL ENCOUNTER: Primary | ICD-10-CM

## 2021-04-30 DIAGNOSIS — E66.9 OBESITY, UNSPECIFIED CLASSIFICATION, UNSPECIFIED OBESITY TYPE, UNSPECIFIED WHETHER SERIOUS COMORBIDITY PRESENT: ICD-10-CM

## 2021-04-30 DIAGNOSIS — E78.5 HYPERLIPIDEMIA LDL GOAL <100: ICD-10-CM

## 2021-04-30 PROCEDURE — 99213 OFFICE O/P EST LOW 20 MIN: CPT | Performed by: FAMILY MEDICINE

## 2021-04-30 ASSESSMENT — PAIN SCALES - GENERAL: PAINLEVEL: NO PAIN (0)

## 2021-04-30 NOTE — PROGRESS NOTES
Shanique le is a 40 year old who presents for the following health issues     HPI     Right leg pain for the past 3 days. No known injury      Review of Systems   Was doing some walking daily    Last 3 days sharp pain back of right calf   Mainly with walking  No pain if sitting    No trauma    No history of this in past         Objective    /84 (BP Location: Right arm, Patient Position: Chair, Cuff Size: Adult Regular)   Pulse 74   Temp 97.8  F (36.6  C) (Oral)   Wt 92.7 kg (204 lb 6 oz)   BMI 32.01 kg/m    Body mass index is 32.01 kg/m .  Physical Exam    Full physical not done     Mentation and affect are fine    No tremor of speech or extremity    Patient has some tenderness mid medial calf right leg    No tenderness at all on achilles / ankle/ knee    No swelling/ discoloration    Range of motion okay    No pain when moving through range of motion     Some pain when he stands and gets up on toes        Reviewed the recent labs    ASSESSMENT / PLAN:  (T00.103Z) Strain of calf muscle, right, initial encounter  (primary encounter diagnosis)  Comment: soft tissue strain.  Exam not worrisome.  Discussed in detail.  Patient will do some easy range of motion /stretching. Occasional over the counter med if needed.  If symptoms not resolving or worsening then see sports med   Plan: Orthopedic & Spine  Referral             (E78.5) Hyperlipidemia LDL goal <100  Comment: labs excellent   Plan: continue statin    (E66.9) Obesity, unspecified classification, unspecified obesity type, unspecified whether serious comorbidity present  Comment: some slow steady wt loss recently which is good   Plan: continue       I reviewed the patient's medications, allergies, medical history, family history, and social history.    Porter Pritchett MD

## 2021-04-30 NOTE — PATIENT INSTRUCTIONS
Could use over the counter tylenol or occasional ibuprofen if needeed    Do some easy stretching/ range of motion exercises    Return to walking slowly as able    If symptoms not improving or if worsening, then see sports medicine    Stay on cholesterol med

## 2021-10-10 ENCOUNTER — HEALTH MAINTENANCE LETTER (OUTPATIENT)
Age: 41
End: 2021-10-10

## 2021-12-27 ENCOUNTER — IMMUNIZATION (OUTPATIENT)
Dept: NURSING | Facility: CLINIC | Age: 41
End: 2021-12-27
Payer: COMMERCIAL

## 2021-12-27 PROCEDURE — 91300 PR COVID VAC PFIZER DIL RECON 30 MCG/0.3 ML IM: CPT

## 2021-12-27 PROCEDURE — 0004A PR COVID VAC PFIZER DIL RECON 30 MCG/0.3 ML IM: CPT

## 2021-12-27 PROCEDURE — 90686 IIV4 VACC NO PRSV 0.5 ML IM: CPT

## 2021-12-27 PROCEDURE — 90471 IMMUNIZATION ADMIN: CPT

## 2022-01-30 ENCOUNTER — HEALTH MAINTENANCE LETTER (OUTPATIENT)
Age: 42
End: 2022-01-30

## 2022-05-19 ENCOUNTER — LAB (OUTPATIENT)
Dept: LAB | Facility: CLINIC | Age: 42
End: 2022-05-19
Attending: INTERNAL MEDICINE
Payer: COMMERCIAL

## 2022-05-19 ENCOUNTER — HOSPITAL ENCOUNTER (OUTPATIENT)
Dept: CARDIOLOGY | Facility: CLINIC | Age: 42
Discharge: HOME | End: 2022-05-19
Attending: INTERNAL MEDICINE
Payer: COMMERCIAL

## 2022-05-19 ENCOUNTER — OFFICE VISIT (OUTPATIENT)
Dept: CARDIOLOGY | Facility: CLINIC | Age: 42
End: 2022-05-19
Payer: COMMERCIAL

## 2022-05-19 VITALS
SYSTOLIC BLOOD PRESSURE: 121 MMHG | OXYGEN SATURATION: 99 % | DIASTOLIC BLOOD PRESSURE: 83 MMHG | HEART RATE: 70 BPM | HEIGHT: 67 IN | BODY MASS INDEX: 29.66 KG/M2 | WEIGHT: 189 LBS

## 2022-05-19 DIAGNOSIS — E78.5 HYPERLIPIDEMIA LDL GOAL <160: ICD-10-CM

## 2022-05-19 DIAGNOSIS — Q24.8 DOUBLE-CHAMBERED RIGHT VENTRICLE: ICD-10-CM

## 2022-05-19 LAB
CHOLEST SERPL-MCNC: 214 MG/DL
FASTING STATUS PATIENT QL REPORTED: YES
HDLC SERPL-MCNC: 46 MG/DL
LDLC SERPL CALC-MCNC: 123 MG/DL
NONHDLC SERPL-MCNC: 168 MG/DL
TRIGL SERPL-MCNC: 225 MG/DL

## 2022-05-19 PROCEDURE — 93325 DOPPLER ECHO COLOR FLOW MAPG: CPT | Mod: 26 | Performed by: PEDIATRICS

## 2022-05-19 PROCEDURE — 36415 COLL VENOUS BLD VENIPUNCTURE: CPT

## 2022-05-19 PROCEDURE — 99215 OFFICE O/P EST HI 40 MIN: CPT | Mod: 25 | Performed by: INTERNAL MEDICINE

## 2022-05-19 PROCEDURE — 93000 ELECTROCARDIOGRAM COMPLETE: CPT | Performed by: INTERNAL MEDICINE

## 2022-05-19 PROCEDURE — 93325 DOPPLER ECHO COLOR FLOW MAPG: CPT

## 2022-05-19 PROCEDURE — 80061 LIPID PANEL: CPT

## 2022-05-19 PROCEDURE — 93303 ECHO TRANSTHORACIC: CPT | Mod: 26 | Performed by: PEDIATRICS

## 2022-05-19 PROCEDURE — 93320 DOPPLER ECHO COMPLETE: CPT | Mod: 26 | Performed by: PEDIATRICS

## 2022-05-19 NOTE — PATIENT INSTRUCTIONS
You were seen today in the Adult Congenital and Cardiovascular Genetics Clinic at the [facility].    Cardiology Providers you saw during your visit:  LUANNE Oliver MD    Diagnosis:  Double chambered right ventricle    Results:  LUANNE Oliver MD reviewed the results of your EKG, echo, and lab testing today in clinic.    Recommendations:    Continue to eat a heart healthy, low salt diet.  Continue to get 20-30 minutes of aerobic activity, 4-5 days per week.  Examples of aerobic activity include walking, running, swimming, cycling, etc.  Continue to observe good oral hygiene, with regular dental visits.  No changes today      SBE prophylaxis:   Yes____  No__x__    Lifelong Bacterial Endocarditis Prophylaxis:  YES____  NO____    If YES is checked, follow the recommendations outlined below:  Take antibiotic(s) prior to recommended dental procedures and procedures on the respiratory tract or with infected skin, muscle or bones. SBE prophylaxis is not needed for routine GI and  procedures (ie. Colonoscopy or vaginal delivery)  Observe good oral hygiene daily, as advised by your dentist. Get regular professional dental care.  Keep cuts clean.  Infections should be treated promptly.  Symptoms of Infective Endocarditis could include: fever lasting more than 4-5 days or a recurrent fever that initially resolves but returns within 1-2 days)      Exercise restrictions:   Yes__X__  No____         If yes, list restrictions:  Must be allowed to rest if fatigued or SOB      Work restrictions:  Yes____  No_X___         If yes, list restrictions:    FASTING CHOLESTEROL was checked in the last 5 years YES_x__  NO___ (2022)  Continue to eat a heart healthy, low salt diet.         ____ Fasting lipid panel order today         ____ No changes in medications          ____ I recommend the following changes in your cholesterol medications.:          ____ Please follow up for cholesterol screening at your primary care  physician      Follow-up:  Follow up with Dr. Oliver in 1 year with cardiac MRI and CPX prior    CardioPulmonary Stress Test (CPX)  CPX is a maximal (meaning you exercise to exhaustion, not to achieve a heart rate) exercise test where we measure how well your heart/body uses oxygen or energy. It is the gold standard for measuring functional capacity and helps us differentiate limitations due to lungs, heart, or fitness.   A CPX is NOT a typical stress test. You will NOT be asked to hold your Beta Blocker medication.   You will be scheduled for a CardioPulmonary Stress Test at the [facility] ([address], [facility] [address], [phone]).  Follow these instructions:    1. Report to the [facility] waiting room in the [place].  2. Nothing to eat for 3 hours prior to your test. You may have clear liquids up to the time of your test.  3. Please wear loose, two-piece clothing and comfortable, rubber-soled shoes for walking.     For Patients with Diabetes: Your RN Coordinator will give you instructions on adjusting your diabetic medications for the day of your test                           If you have questions please contact your diabetic care team.                          Remember to  bring your glucometer & insulin with you to take after your test if needed.      Cardiac MRI  Magnetic resonance imaging (MRI) is a test that lets your doctor see detailed pictures of the inside of your body. MRI combines the use of strong magnets and radio waves to form an MRI image. A Cardiac MRI will give a detailed image of your heart.  Follow these instructions:  1. Please no eating or drinking 3 hours prior to the procedure.   2. No caffeine, alcohol or tobacco 12 hours prior to the procedure.    During the procedure:  Please arrive to the [facility] Waiting Room in the [facility].   You will be required to lay flat and follow breath-hold instructions.   You will need to remove all metal and answer a safety  questionnaire. Please wear clothes without metal (snaps and zippers). Please remove any body piercings and hair extensions before you arrive. You will also remove watches, jewelry, hairpins, wallets, dentures, partial dental plates and hearing aids. You may wear contact lenses, and you may be able to wear your rings. We have a safe place to keep your personal items, but it is safer to leave them at home.  You will be given IV contrast for this exam.  To prepare:  The day before the exam drink extra fluid - at least six 8oz glasses (unless you are on a fluid restriction per your doctor)       If you have questions or concerns please contact us at:    TIM LeN, RN    Lorena Awad (Scheduling)  Nurse Care Coordinator     Clinic   Adult Congenital and CV Genetics   Adult Congenital and CV Genetic  [facility]   [facility]  (P) [phone]     (P) [phone]         (F) [phone]        For after hours urgent needs, call [phone] and ask to speak to the Adult Congenital Physician on call.  Mention Job Code 0401.    For emergencies call 911.    [facility]  [facility]   Clinics and Surgery Center  Mail Code [id number]  [address] [place] [address]

## 2022-05-19 NOTE — PROGRESS NOTES
CARDIOLOGY CONSULTATION:    I had the pleasure of seeing Denver Goff.  He has a past medical history significant for double chamber right ventricle undergoing surgery 9/22/2008 at [facility] (Dr. Ac); surgery was uncomplicated except for possible arrhythmia that is now resolved. He was being followed by Dr. Grigsby before moving to [place] and establishing care in our clinic 10/2019.  Denver has not had any fatigue, exercise intolerance, diaphoresis, tachycardia, chest pain, dyspnea, syncope, dizziness, palpitations, cyanosis, edema.  He has been exercising since I last saw him walking more than 5 miles a day and lost 15 pounds!  He is not taking the cholesterol medicine but his LDL is down from 166 to 125 with just the lifestyle change.  Echo shows RV enlargement in the moderate range with some dysfunction. He has moderate TR.  There is no evidence of obstruction.  O reviewed his two previous echos from here and they were not as clear images but the TR does seem a bit worse.   He underwent a CPX in 2021 here for the first time.  He was not exercising at the time.  His RER was 1.4.  VE/VC02 was 33.   V02 13.5.  He states he does not feel limited at this time.     Family history is negative for congenital heart disease or acquired structural heart disease, but for his sister who had a child with a hole in the heart. He is single. 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 He had covid 1/2021 and did ok.  He used to smoke from age 24 until 32    PAST MEDICAL HISTORY:  Past Medical History:   Diagnosis Date     Congenital heart anomaly        CURRENT MEDICATIONS:  Current Outpatient Medications   Medication Sig Dispense Refill     rosuvastatin (CRESTOR) 10 MG tablet Take 1 tablet (10 mg) by mouth daily 90 tablet 3       PAST SURGICAL HISTORY:  Past Surgical History:   Procedure Laterality Date     CYSTOSCOPY, RETROGRADES, EXTRACT STONE, INSERT STENT, COMBINED Left 7/24/2019     "Procedure: Cystoscopy, left retrograde pyelogram, interpretation of fluoroscopic images, left ureteroscopy with stone basketing, placement of 5 x 26 double-J left ureteral stent, laser on stand-by;  Surgeon: Gavino Delarosa MD;  Location: [facility] OR       ALLERGIES  No known allergies    FAMILY HX:  Family History   Problem Relation Age of Onset     Hypertension Mother      Hyperlipidemia Father      Lung Cancer Father          68 smoker       SOCIAL HX:  Social History     Socioeconomic History     Marital status:      Spouse name: None     Number of children: 0     Years of education: None     Highest education level: None   Tobacco Use     Smoking status: Former Smoker     Types: Hookah     Smokeless tobacco: Never Used   Substance and Sexual Activity     Alcohol use: Not Currently     Drug use: Never     Sexual activity: Yes     Partners: Female       ROS:  Constitutional: No fever, chills, or sweats. No weight gain/loss.   ENT: No visual disturbance, ear ache, epistaxis, sore throat.   Allergies/Immunologic: Negative.   Respiratory: No cough, hemoptysis.   Cardiovascular: As per HPI.   GI: No nausea, vomiting, hematemesis, melena, or hematochezia.   : No urinary frequency, dysuria, or hematuria.   Integument: Negative.   Psychiatric: Negative.   Neuro: Negative.   Endocrinology: Negative.   Musculoskeletal: No myalgia.    VITAL SIGNS:  /83   Pulse 70   Ht 1.702 m (5' 7\")   Wt 85.7 kg (189 lb)   SpO2 99%   BMI 29.60 kg/m    Body mass index is 29.6 kg/m .  Wt Readings from Last 2 Encounters:   22 85.7 kg (189 lb)   21 92.7 kg (204 lb 6 oz)       PHYSICAL EXAM  Denver Goff IS A 41 year old male.in no acute distress.  HEENT: Unremarkable.  Neck: JVP normal.  Carotids +4/4 bilaterally without bruits.  Lungs: CTA.  Cor: RRR. Normal S1 and S2.  No murmur, rub, or gallop.  PMI in Lf 5th ICS.  Abd: Soft, nontender, nondistended.  NABS.  No pulsatile mass.  Extremities: No " C/C/E.  Pulses +4/4 symmetric in upper and lower extremities.  Neuro: Grossly intact.    LABS    Lab Results   Component Value Date    WBC 5.6 03/12/2021     Lab Results   Component Value Date    RBC 5.23 03/12/2021     Lab Results   Component Value Date    HGB 15.5 03/12/2021     Lab Results   Component Value Date    HCT 45.6 03/12/2021     No components found for: MCT  Lab Results   Component Value Date    MCV 87 03/12/2021     Lab Results   Component Value Date    MCH 29.6 03/12/2021     Lab Results   Component Value Date    MCHC 34.0 03/12/2021     Lab Results   Component Value Date    RDW 14.9 03/12/2021     Lab Results   Component Value Date     03/12/2021      Recent Labs   Lab Test 03/12/21  1031 09/05/19  1545    139   POTASSIUM 4.3 4.2   CHLORIDE 107 109   CO2 28 24   ANIONGAP 3 6   GLC 80 76   BUN 15 13   CR 0.85 0.81   LUIGI 9.4 9.1     Recent Labs   Lab Test 05/19/22  0735 03/12/21  1031   CHOL 214* 147   HDL 46 47   * 70   TRIG 225* 149   NHDL 168* 100        ASSESSMENT AND PLAN:  1) Double chamber RV, S/P repair 9/22/2008 at [facility] (Dr. Ac) with RV dysfunction and moderate TR and 2021 CPX with cardiac limitation (class III limitation)    2) Hyperlipidemia     Assessment and Plan: Denver is a 41 year old male with history of double chambered right ventricle status post uncomplicated surgical repair. He has been doing well and is currently asymptomatic.  I discussed that I would like to repeat the CPX now that he is exercising, but he would like to wait to do this till next year.  We will do a cardiac MRI at that time as well.  He will let us know if there are any issues before we see him back.     Thank you for the opportunity to meet Denver. Please don't hesitate to contact me with questions or concerns.     LUANNE Oliver MD      42 minutes face-face and documentation and review on day of visit.

## 2022-05-19 NOTE — LETTER
5/19/2022    Porter Pritchett MD  [address] [place] [address]    RE: Denver Goff       Dear Colleague,     I had the pleasure of seeing Denver Goff in the [facility].  CARDIOLOGY CONSULTATION:    I had the pleasure of seeing Denver Goff.  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 Ac); surgery was uncomplicated except for possible arrhythmia that is now resolved. He was being followed by Dr. Grigsby before moving to [place] and establishing care in our clinic 10/2019.  Denver has not had any fatigue, exercise intolerance, diaphoresis, tachycardia, chest pain, dyspnea, syncope, dizziness, palpitations, cyanosis, edema.  He has been exercising since I last saw him walking more than 5 miles a day and lost 15 pounds!  He is not taking the cholesterol medicine but his LDL is down from 166 to 125 with just the lifestyle change.  Echo shows RV enlargement in the moderate range with some dysfunction. He has moderate TR.  There is no evidence of obstruction.  O reviewed his two previous echos from here and they were not as clear images but the TR does seem a bit worse.   He underwent a CPX in 2021 here for the first time.  He was not exercising at the time.  His RER was 1.4.  VE/VC02 was 33.   V02 13.5.  He states he does not feel limited at this time.     Family history is negative for congenital heart disease or acquired structural heart disease, but for his sister who had a child with a hole in the heart. He is single. 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 He had covid 1/2021 and did ok.  He used to smoke from age 24 until 32    PAST MEDICAL HISTORY:  Past Medical History:   Diagnosis Date     Congenital heart anomaly        CURRENT MEDICATIONS:  Current Outpatient Medications   Medication Sig Dispense Refill     rosuvastatin (CRESTOR) 10 MG tablet  "Take 1 tablet (10 mg) by mouth daily 90 tablet 3       PAST SURGICAL HISTORY:  Past Surgical History:   Procedure Laterality Date     CYSTOSCOPY, RETROGRADES, EXTRACT STONE, INSERT STENT, COMBINED Left 2019    Procedure: Cystoscopy, left retrograde pyelogram, interpretation of fluoroscopic images, left ureteroscopy with stone basketing, placement of 5 x 26 double-J left ureteral stent, laser on stand-by;  Surgeon: Gavino Delarosa MD;  Location: [facility] OR       ALLERGIES  No known allergies    FAMILY HX:  Family History   Problem Relation Age of Onset     Hypertension Mother      Hyperlipidemia Father      Lung Cancer Father          68 smoker       SOCIAL HX:  Social History     Socioeconomic History     Marital status:      Spouse name: None     Number of children: 0     Years of education: None     Highest education level: None   Tobacco Use     Smoking status: Former Smoker     Types: Hookah     Smokeless tobacco: Never Used   Substance and Sexual Activity     Alcohol use: Not Currently     Drug use: Never     Sexual activity: Yes     Partners: Female       ROS:  Constitutional: No fever, chills, or sweats. No weight gain/loss.   ENT: No visual disturbance, ear ache, epistaxis, sore throat.   Allergies/Immunologic: Negative.   Respiratory: No cough, hemoptysis.   Cardiovascular: As per HPI.   GI: No nausea, vomiting, hematemesis, melena, or hematochezia.   : No urinary frequency, dysuria, or hematuria.   Integument: Negative.   Psychiatric: Negative.   Neuro: Negative.   Endocrinology: Negative.   Musculoskeletal: No myalgia.    VITAL SIGNS:  /83   Pulse 70   Ht 1.702 m (5' 7\")   Wt 85.7 kg (189 lb)   SpO2 99%   BMI 29.60 kg/m    Body mass index is 29.6 kg/m .  Wt Readings from Last 2 Encounters:   22 85.7 kg (189 lb)   21 92.7 kg (204 lb 6 oz)       PHYSICAL EXAM  Denver Goff IS A 41 year old male.in no acute distress.  HEENT: Unremarkable.  Neck: JVP " normal.  Carotids +4/4 bilaterally without bruits.  Lungs: CTA.  Cor: RRR. Normal S1 and S2.  No murmur, rub, or gallop.  PMI in Lf 5th ICS.  Abd: Soft, nontender, nondistended.  NABS.  No pulsatile mass.  Extremities: No C/C/E.  Pulses +4/4 symmetric in upper and lower extremities.  Neuro: Grossly intact.    LABS    Lab Results   Component Value Date    WBC 5.6 03/12/2021     Lab Results   Component Value Date    RBC 5.23 03/12/2021     Lab Results   Component Value Date    HGB 15.5 03/12/2021     Lab Results   Component Value Date    HCT 45.6 03/12/2021     No components found for: MCT  Lab Results   Component Value Date    MCV 87 03/12/2021     Lab Results   Component Value Date    MCH 29.6 03/12/2021     Lab Results   Component Value Date    MCHC 34.0 03/12/2021     Lab Results   Component Value Date    RDW 14.9 03/12/2021     Lab Results   Component Value Date     03/12/2021      Recent Labs   Lab Test 03/12/21  1031 09/05/19  1545    139   POTASSIUM 4.3 4.2   CHLORIDE 107 109   CO2 28 24   ANIONGAP 3 6   GLC 80 76   BUN 15 13   CR 0.85 0.81   LUIGI 9.4 9.1     Recent Labs   Lab Test 05/19/22  0735 03/12/21  1031   CHOL 214* 147   HDL 46 47   * 70   TRIG 225* 149   NHDL 168* 100        ASSESSMENT AND PLAN:  1) Double chamber RV, 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 Ac) with RV dysfunction and moderate TR and 2021 CPX with cardiac limitation (class III limitation)    2) Hyperlipidemia     Assessment and Plan: Denver is a 41 year old male with history of double chambered right ventricle status post uncomplicated surgical repair. He has been doing well and is currently asymptomatic.  I discussed that I would like to repeat the CPX now that he is exercising, but he would like to wait to do this till next year.  We will do a cardiac MRI at that time as well.  He will let us know if there are any issues before we see him back.     Thank you for the opportunity to meet Denver. Please don't  hesitate to contact me with questions or concerns.     LUANNE Oliver MD      42 minutes face-face and documentation and review on day of visit.    Thank you for allowing me to participate in the care of your patient.      Sincerely,     Ronaldo Oliver MD     [facility]  cc:   Tessy Reina MD  [address] [facility] [address] [address]

## 2022-05-19 NOTE — NURSING NOTE
Cardiac Testing: Patient given instructions regarding CPX and cardiac MRI. Discussed purpose, preparation, procedure and when to expect results reported back to the patient. Patient demonstrated understanding of this information and agreed to call with further questions or concerns.    Med Reconcile: Reviewed and verified all current medications with the patient. The updated medication list was printed and given to the patient.    Return Appointment: Patient given instructions regarding scheduling next clinic visit. Patient demonstrated understanding of this information and agreed to call with further questions or concerns.    Medication Change: pt stopped Crestor and is maintaining a healthy diet, stopped crestor and will follow lipids. Patient was educated regarding prescribed medication change, including discussion of the indication, administration, side effects, and when to report to MD or RN. Patient demonstrated understanding of this information and agreed to call with further questions or concerns.  Patient stated he understood all health information given and agreed to call with further questions or concerns.

## 2022-09-18 ENCOUNTER — HEALTH MAINTENANCE LETTER (OUTPATIENT)
Age: 42
End: 2022-09-18

## 2023-05-07 ENCOUNTER — HEALTH MAINTENANCE LETTER (OUTPATIENT)
Age: 43
End: 2023-05-07

## 2023-05-17 ENCOUNTER — OFFICE VISIT (OUTPATIENT)
Dept: FAMILY MEDICINE | Facility: CLINIC | Age: 43
End: 2023-05-17
Payer: COMMERCIAL

## 2023-05-17 VITALS
BODY MASS INDEX: 29.51 KG/M2 | WEIGHT: 188 LBS | HEART RATE: 74 BPM | HEIGHT: 67 IN | OXYGEN SATURATION: 99 % | SYSTOLIC BLOOD PRESSURE: 106 MMHG | TEMPERATURE: 97.6 F | DIASTOLIC BLOOD PRESSURE: 74 MMHG | RESPIRATION RATE: 16 BRPM

## 2023-05-17 DIAGNOSIS — H92.01 RIGHT EAR PAIN: Primary | ICD-10-CM

## 2023-05-17 DIAGNOSIS — J32.9 SINUSITIS, UNSPECIFIED CHRONICITY, UNSPECIFIED LOCATION: ICD-10-CM

## 2023-05-17 DIAGNOSIS — H69.91 DYSFUNCTION OF RIGHT EUSTACHIAN TUBE: ICD-10-CM

## 2023-05-17 PROCEDURE — 99213 OFFICE O/P EST LOW 20 MIN: CPT | Performed by: FAMILY MEDICINE

## 2023-05-17 RX ORDER — PSEUDOEPHEDRINE HCL 120 MG/1
120 TABLET, FILM COATED, EXTENDED RELEASE ORAL 2 TIMES DAILY PRN
Qty: 20 TABLET | Refills: 0 | Status: SHIPPED | OUTPATIENT
Start: 2023-05-17

## 2023-05-17 RX ORDER — FLUTICASONE PROPIONATE 50 MCG
2 SPRAY, SUSPENSION (ML) NASAL DAILY
Qty: 16 G | Refills: 1 | Status: SHIPPED | OUTPATIENT
Start: 2023-05-17 | End: 2024-05-28

## 2023-05-17 RX ORDER — AMOXICILLIN 500 MG/1
500 CAPSULE ORAL 3 TIMES DAILY
Qty: 30 CAPSULE | Refills: 0 | Status: SHIPPED | OUTPATIENT
Start: 2023-05-17 | End: 2023-05-27

## 2023-05-17 ASSESSMENT — PAIN SCALES - GENERAL: PAINLEVEL: NO PAIN (0)

## 2023-05-17 NOTE — PROGRESS NOTES
"       Subjective   Denver is a 42 year old, presenting for the following health issues:  Otalgia        5/17/2023     9:53 AM   Additional Questions   Roomed by Esme Siddiqi     History of Present Illness       Reason for visit:  I have ear pain  Symptom onset:  1-3 days ago  Symptoms include:  Pain  Symptom intensity:  Mild  Symptom progression:  Staying the same  Had these symptoms before:  Tino consumes 0 sweetened beverage(s) daily.He exercises with enough effort to increase his heart rate 60 or more minutes per day.  He exercises with enough effort to increase his heart rate 6 days per week.   He is taking medications regularly.        Review of Systems   Started 5 days ago per patient  When moving head to left, pain    Ear problem long time ago, infection    No fever/ chills    No cough    No chest pain    Plugged    No itchy/ watery eyes    No sneezing or runny nose            Objective    /74 (BP Location: Right arm, Patient Position: Chair, Cuff Size: Adult Regular)   Pulse 74   Temp 97.6  F (36.4  C) (Temporal)   Resp 16   Ht 1.702 m (5' 7\")   Wt 85.3 kg (188 lb)   SpO2 99%   BMI 29.44 kg/m    Body mass index is 29.44 kg/m .  Physical Exam  Constitutional:       Appearance: He is well-developed.   HENT:      Head: Normocephalic and atraumatic.      Right Ear: Tympanic membrane and ear canal normal.      Left Ear: Tympanic membrane, ear canal and external ear normal.      Ears:      Comments: Slight subjective soreness when pulling on helix and pushing on tragus right ear but no tenderness anywhere around ear at all and canal/ tympanic membrane looked fine.     Nose:      Comments: Nasal mucosa looked red, swollen, with some discharge present.     Mouth/Throat:      Mouth: Mucous membranes are moist.   Eyes:      Conjunctiva/sclera: Conjunctivae normal.   Neck:      Vascular: No carotid bruit.   Cardiovascular:      Rate and Rhythm: Normal rate and regular rhythm.      Heart sounds: Normal " heart sounds.   Pulmonary:      Effort: Pulmonary effort is normal. No respiratory distress.      Breath sounds: Normal breath sounds.   Neurological:      Mental Status: He is alert and oriented to person, place, and time.      Cranial Nerves: No cranial nerve deficit.   Psychiatric:         Speech: Speech normal.         Behavior: Behavior normal.      no submandib or maxillary / frontal tenderness        ASSESSMENT / PLAN:  (H92.01) Right ear pain  (primary encounter diagnosis)  Comment: ear exam proper is very reassuring  Plan: likely symptoms via eustacian tube    (H69.81) Dysfunction of right eustachian tube  Comment: discussed.  Trial of daily flonase and prn sudafed.    Plan: pseudoePHEDrine (SUDAFED) 120 MG 12 hr tablet,         fluticasone (FLONASE) 50 MCG/ACT nasal spray             (J32.9) Sinusitis, unspecified chronicity, unspecified location  Comment: if above not improving , then could do trial of antibiotic.    Plan: amoxicillin (AMOXIL) 500 MG capsule             Call/ return to clinic if symptoms not improving       I reviewed the patient's medications, allergies, medical history, family history, and social history.    Porter Pritchett MD

## 2023-05-17 NOTE — PATIENT INSTRUCTIONS
Try as needed decongestant ( pseudoephedrine )    Also daily flonase for the next 2-3 weeks    If symptoms not improving then could take the antibiotic, but hopefully won't need this    Call if not better after that     Stay well hydrated

## 2023-07-20 ENCOUNTER — HOSPITAL ENCOUNTER (OUTPATIENT)
Dept: MRI IMAGING | Facility: CLINIC | Age: 43
Discharge: HOME | End: 2023-07-20
Attending: INTERNAL MEDICINE
Payer: COMMERCIAL

## 2023-07-20 ENCOUNTER — LAB (OUTPATIENT)
Dept: LAB | Facility: CLINIC | Age: 43
End: 2023-07-20
Payer: COMMERCIAL

## 2023-07-20 DIAGNOSIS — Q24.8 DOUBLE-CHAMBERED RIGHT VENTRICLE: ICD-10-CM

## 2023-07-20 DIAGNOSIS — E78.5 HYPERLIPIDEMIA LDL GOAL <160: ICD-10-CM

## 2023-07-20 LAB
CHOLEST SERPL-MCNC: 276 MG/DL
HDLC SERPL-MCNC: 50 MG/DL
LDLC SERPL CALC-MCNC: 187 MG/DL
NONHDLC SERPL-MCNC: 226 MG/DL
TRIGL SERPL-MCNC: 196 MG/DL

## 2023-07-20 PROCEDURE — 75565 CARD MRI VELOC FLOW MAPPING: CPT | Mod: 26 | Performed by: INTERNAL MEDICINE

## 2023-07-20 PROCEDURE — 75561 CARDIAC MRI FOR MORPH W/DYE: CPT | Mod: 26 | Performed by: INTERNAL MEDICINE

## 2023-07-20 PROCEDURE — 36415 COLL VENOUS BLD VENIPUNCTURE: CPT | Performed by: INTERNAL MEDICINE

## 2023-07-20 PROCEDURE — A9585 GADOBUTROL INJECTION: HCPCS | Mod: JZ | Performed by: INTERNAL MEDICINE

## 2023-07-20 PROCEDURE — 75565 CARD MRI VELOC FLOW MAPPING: CPT

## 2023-07-20 PROCEDURE — 80061 LIPID PANEL: CPT | Performed by: INTERNAL MEDICINE

## 2023-07-20 PROCEDURE — 255N000002 HC RX 255 OP 636: Mod: JZ | Performed by: INTERNAL MEDICINE

## 2023-07-20 RX ORDER — GADOBUTROL 604.72 MG/ML
10 INJECTION INTRAVENOUS ONCE
Status: COMPLETED | OUTPATIENT
Start: 2023-07-20 | End: 2023-07-20

## 2023-07-20 RX ADMIN — GADOBUTROL 10 ML: 604.72 INJECTION INTRAVENOUS at 09:49

## 2023-08-10 ENCOUNTER — OFFICE VISIT (OUTPATIENT)
Dept: CARDIOLOGY | Facility: CLINIC | Age: 43
End: 2023-08-10
Attending: INTERNAL MEDICINE
Payer: COMMERCIAL

## 2023-08-10 VITALS
SYSTOLIC BLOOD PRESSURE: 123 MMHG | HEIGHT: 67 IN | DIASTOLIC BLOOD PRESSURE: 81 MMHG | WEIGHT: 187.1 LBS | HEART RATE: 64 BPM | BODY MASS INDEX: 29.37 KG/M2 | OXYGEN SATURATION: 99 %

## 2023-08-10 DIAGNOSIS — E78.5 HYPERLIPIDEMIA LDL GOAL <160: ICD-10-CM

## 2023-08-10 DIAGNOSIS — Q24.9 ADULT CONGENITAL HEART DISEASE: Primary | ICD-10-CM

## 2023-08-10 DIAGNOSIS — Q24.8 DOUBLE-CHAMBERED RIGHT VENTRICLE: ICD-10-CM

## 2023-08-10 PROCEDURE — 93000 ELECTROCARDIOGRAM COMPLETE: CPT | Performed by: INTERNAL MEDICINE

## 2023-08-10 PROCEDURE — 99215 OFFICE O/P EST HI 40 MIN: CPT | Performed by: INTERNAL MEDICINE

## 2023-08-10 RX ORDER — ROSUVASTATIN CALCIUM 5 MG/1
5 TABLET, COATED ORAL DAILY
Qty: 90 TABLET | Refills: 3 | Status: SHIPPED | OUTPATIENT
Start: 2023-08-10 | End: 2024-06-04

## 2023-08-10 NOTE — LETTER
8/10/2023    Porter Pritchett MD  [address] [facility] [facility] [address] [place] [address]    RE: Denver Goff       Dear Colleague,     I had the pleasure of seeing Denver Goff in the [facility].  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 Ac); surgery was uncomplicated except for possible arrhythmia that is now resolved.     He was being followed by Dr. Grigsby before moving to [place] and establishing care in our clinic 10/2019.  Denver has not had any fatigue, exercise intolerance, diaphoresis, tachycardia, chest pain, dyspnea, syncope, dizziness, palpitations, cyanosis, edema.  He has been exercising since I last saw him walking more than 5 miles a day and lost 15 pounds!      He is not taking the cholesterol medicine that we originally started for LDL 180s that originally improved but has gone back up and is 187.       Echo shows RV enlargement in the moderate range without any significant dysfunction. He has mild TR.   He had a cardiac MRI 7/2023 and that confirmed the echo finding with no RV outflow obstruction.  He underwent a CPX in 2021. His RER was 1.4.  VE/VC02 was 33. V02 13.5.  He is to repeat it this year - was not able to do yet because his ankle was injured but plans to do in October when he returns from [place] and [facility].     Family history is negative for congenital heart disease or acquired structural heart disease, but for his sister who had a child with a hole in the heart. He is single. 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 He had covid in 2020/early 2021 and did ok.  He used to smoke from age 24 until 32. His mom had a clot in her leg with covid. His mom and his sister are here in [facility].        PAST MEDICAL HISTORY:  Past Medical History:   Diagnosis Date    Congenital heart anomaly        CURRENT  MEDICATIONS:  Current Outpatient Medications   Medication Sig Dispense Refill    fluticasone (FLONASE) 50 MCG/ACT nasal spray Spray 2 sprays into both nostrils daily (Patient taking differently: Spray 2 sprays into both nostrils as needed for allergies) 16 g 1    pseudoePHEDrine (SUDAFED) 120 MG 12 hr tablet Take 1 tablet (120 mg) by mouth 2 times daily as needed for congestion 20 tablet 0       PAST SURGICAL HISTORY:  Past Surgical History:   Procedure Laterality Date    CYSTOSCOPY, RETROGRADES, EXTRACT STONE, INSERT STENT, COMBINED Left 2019    Procedure: Cystoscopy, left retrograde pyelogram, interpretation of fluoroscopic images, left ureteroscopy with stone basketing, placement of 5 x 26 double-J left ureteral stent, laser on stand-by;  Surgeon: Gavino Delarosa MD;  Location: [facility] OR       ALLERGIES  No known allergies    FAMILY HX:  Family History   Problem Relation Age of Onset    Hypertension Mother     Hyperlipidemia Father     Lung Cancer Father          68 smoker       SOCIAL HX:  Social History     Socioeconomic History    Marital status:      Spouse name: None    Number of children: 0    Years of education: None    Highest education level: None   Tobacco Use    Smoking status: Former     Types: Hookah    Smokeless tobacco: Never   Vaping Use    Vaping Use: Never used   Substance and Sexual Activity    Alcohol use: Not Currently    Drug use: Never    Sexual activity: Yes     Partners: Female       ROS:  Constitutional: No fever, chills, or sweats. No weight gain/loss.   ENT: No visual disturbance, ear ache, epistaxis, sore throat.   Allergies/Immunologic: Negative.   Respiratory: No cough, hemoptysis.   Cardiovascular: As per HPI.   GI: No nausea, vomiting, hematemesis, melena, or hematochezia.   : No urinary frequency, dysuria, or hematuria.   Integument: Negative.   Psychiatric: Negative.   Neuro: Negative.   Endocrinology: Negative.   Musculoskeletal: No myalgia.    VITAL  "SIGNS:  /81 (BP Location: Left arm, Patient Position: Sitting)   Pulse 64   Ht 1.702 m (5' 7\")   Wt 84.9 kg (187 lb 1.6 oz)   SpO2 99%   BMI 29.30 kg/m    Body mass index is 29.3 kg/m .  Wt Readings from Last 2 Encounters:   08/10/23 84.9 kg (187 lb 1.6 oz)   05/17/23 85.3 kg (188 lb)       PHYSICAL EXAM  Denver Goff IS A 42 year old male.in no acute distress.  HEENT: Unremarkable.  Neck: JVP normal.  Carotids +4/4 bilaterally without bruits.  Lungs: CTA.  Cor: RRR. Normal S1 and S2.  No murmur, rub, or gallop.  PMI in Lf 5th ICS.  Abd: Soft, nontender, nondistended.  NABS.  No pulsatile mass.  Extremities: No C/C/E.  Pulses +4/4 symmetric in upper and lower extremities.  Neuro: Grossly intact.    LABS    Lab Results   Component Value Date    WBC 5.6 03/12/2021     Lab Results   Component Value Date    RBC 5.23 03/12/2021     Lab Results   Component Value Date    HGB 15.5 03/12/2021     Lab Results   Component Value Date    HCT 45.6 03/12/2021     No components found for: MCT  Lab Results   Component Value Date    MCV 87 03/12/2021     Lab Results   Component Value Date    MCH 29.6 03/12/2021     Lab Results   Component Value Date    MCHC 34.0 03/12/2021     Lab Results   Component Value Date    RDW 14.9 03/12/2021     Lab Results   Component Value Date     03/12/2021      Recent Labs   Lab Test 03/12/21  1031 09/05/19  1545    139   POTASSIUM 4.3 4.2   CHLORIDE 107 109   CO2 28 24   ANIONGAP 3 6   GLC 80 76   BUN 15 13   CR 0.85 0.81   LUIGI 9.4 9.1     Recent Labs   Lab Test 07/20/23  0821 05/19/22  0735   CHOL 276* 214*   HDL 50 46   * 123*   TRIG 196* 225*   NHDL 226* 168*        ASSESSMENT AND PLAN:  1) Double chamber RV, 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 Ac) with RV dysfunction and moderate TR and 2021 CPX with cardiac limitation (class III limitation)     2) Hyperlipidemia     Assessment and Plan: Denver is a 42 year old male with history of double chambered right " ventricle status post uncomplicated surgical repair. He has been doing well and is currently asymptomatic.  I discussed that I would like to repeat the CPX and will do this in October 2023. We restarted crestor at 5mg daily and will check lipids and A1C and AST at that time. Echo and follow up in a year.  He will let us know if there are any issues before we see him back.     Thank you for the opportunity to meet Mohamed. Please don't hesitate to contact me with questions or concerns.     LUANNE Oliver MD      45 minutes face-face and documentation and review on day of visit.      Thank you for allowing me to participate in the care of your patient.      Sincerely,     Ronaldo Oliver MD     [facility]  cc:   Ronaldo Oliver MD  [address] [place] [address] [place] [id number]

## 2023-08-10 NOTE — PATIENT INSTRUCTIONS
You were seen today in the Adult Congenital and Cardiovascular Genetics Clinic at the [facility].    Cardiology Providers you saw during your visit:  LUANNE Oliver MD    Diagnosis:  double chambered right ventricle    Results:  LUANNE Oliver MD reviewed the results of your EKG, CMRI and lipid testing today in clinic.    Recommendations for you:    START crestor 5 mg daily  Complete fasting labs and CPX in October, we will call you to schedule      CardioPulmonary Stress Test (CPX)  CPX is a maximal (meaning you exercise to exhaustion, not to achieve a heart rate) exercise test where we measure how well your heart/body uses oxygen or energy. It is the gold standard for measuring functional capacity and helps us differentiate limitations due to lungs, heart, or fitness.   A CPX is NOT a typical stress test. You will NOT be asked to hold your Beta Blocker medication.   You will be scheduled for a CardioPulmonary Stress Test at the [facility] ([address], [facility] [address], [phone]).  Follow these instructions:    1. Report to the GOLD waiting room in the main hospital.  2. Nothing to eat for 3 hours prior to your test. You may have clear liquids up to the time of your test.  3. Please wear loose, two-piece clothing and comfortable, rubber-soled shoes for walking.       What happens during this test?   We will place a blood pressure cuff on your arm. We will also attach small pads to your chest. The pads are hooked to an EKG (electrocardiogram) machine that shows how your heart is working.  You will walk on a treadmill or pedal a bicycle.  You will breathe through a mouthpiece, and the air you breathe out will be measured at rest and during exercise.  We will watch your EKG, heart rate and heart rhythm the entire time.  We will check your blood pressure during the test.  This test takes two (2) hours.      For Patients with Diabetes: Your RN Coordinator will give you  instructions on adjusting your diabetic medications for the day of your test                           If you have questions please contact your diabetic care team.                          Remember to  bring your glucometer & insulin with you to take after your test if needed.        General Cardiac Recommendations:  Continue to eat a heart healthy, low salt diet.  Continue to get 20-30 minutes of aerobic activity, 4-5 days per week.  Examples of aerobic activity include walking, running, swimming, cycling, etc.  Continue to observe good oral hygiene, with regular dental visits.      SBE prophylaxis:   Yes____  No___X_      FASTING CHOLESTEROL was checked in the last 5 years YES__X_  NO___ (2023)      Follow-up:  Follow up with Dr Oliver in 1 year with an echo prior    If you have questions or concerns please contact us at:    Savannah Bruno RN, BSN   Lorena Awad (Scheduling)  Nurse Care Coordinator     Clinic   Adult Congenital and CV Genetics   Adult Congenital and CV Genetic  [facility]   [facility]  (P) [phone]     (P) [phone]            (F) [phone]        For after hours urgent needs, call [phone] and ask to speak to the Adult Congenital Physician on call.  Mention Job Code 0401.    For emergencies call 911.    [facility]  Mail Code [id number]  [address] [place]  [address]

## 2023-08-10 NOTE — NURSING NOTE
Cardiac Testing: Patient given instructions regarding  echocardiogram  and CPX. Discussed purpose, preparation, procedure and when to expect results reported back to the patient. Patient demonstrated understanding of this information and agreed to call with further questions or concerns.    Labs: Patient was given results of the laboratory testing obtained today. Patient was instructed to return for the next laboratory testing in 2 months . Patient demonstrated understanding of this information and agreed to call with further questions or concerns.     Med Reconcile: Reviewed and verified all current medications with the patient. The updated medication list was printed and given to the patient.    New Medication: Patient was educated regarding newly prescribed medication, including discussion of  the indication, administration, side effects, and when to report to MD or RN. Patient demonstrated understanding of this information and agreed to call with further questions or concerns.    Return Appointment: Patient given instructions regarding scheduling next clinic visit. Patient demonstrated understanding of this information and agreed to call with further questions or concerns.    Patient stated he understood all health information given and agreed to call with further questions or concerns.

## 2023-08-10 NOTE — PROGRESS NOTES
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 Ac); surgery was uncomplicated except for possible arrhythmia that is now resolved.     He was being followed by Dr. Grigsby before moving to [place] and establishing care in our clinic 10/2019.  Denver has not had any fatigue, exercise intolerance, diaphoresis, tachycardia, chest pain, dyspnea, syncope, dizziness, palpitations, cyanosis, edema.  He has been exercising since I last saw him walking more than 5 miles a day and lost 15 pounds!      He is not taking the cholesterol medicine that we originally started for LDL 180s that originally improved but has gone back up and is 187.       Echo shows RV enlargement in the moderate range without any significant dysfunction. He has mild TR.   He had a cardiac MRI 7/2023 and that confirmed the echo finding with no RV outflow obstruction.  He underwent a CPX in 2021. His RER was 1.4.  VE/VC02 was 33. V02 13.5.  He is to repeat it this year - was not able to do yet because his ankle was injured but plans to do in October when he returns from [place] and [facility].     Family history is negative for congenital heart disease or acquired structural heart disease, but for his sister who had a child with a hole in the heart. He is single. 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 He had covid in 2020/early 2021 and did ok.  He used to smoke from age 24 until 32. His mom had a clot in her leg with covid. His mom and his sister are here in [facility].        PAST MEDICAL HISTORY:  Past Medical History:   Diagnosis Date    Congenital heart anomaly        CURRENT MEDICATIONS:  Current Outpatient Medications   Medication Sig Dispense Refill    fluticasone (FLONASE) 50 MCG/ACT nasal spray Spray 2 sprays into both nostrils daily (Patient taking differently: Spray 2 sprays into both  "nostrils as needed for allergies) 16 g 1    pseudoePHEDrine (SUDAFED) 120 MG 12 hr tablet Take 1 tablet (120 mg) by mouth 2 times daily as needed for congestion 20 tablet 0       PAST SURGICAL HISTORY:  Past Surgical History:   Procedure Laterality Date    CYSTOSCOPY, RETROGRADES, EXTRACT STONE, INSERT STENT, COMBINED Left 2019    Procedure: Cystoscopy, left retrograde pyelogram, interpretation of fluoroscopic images, left ureteroscopy with stone basketing, placement of 5 x 26 double-J left ureteral stent, laser on stand-by;  Surgeon: Gavino Delarosa MD;  Location: [facility] OR       ALLERGIES  No known allergies    FAMILY HX:  Family History   Problem Relation Age of Onset    Hypertension Mother     Hyperlipidemia Father     Lung Cancer Father          68 smoker       SOCIAL HX:  Social History     Socioeconomic History    Marital status:      Spouse name: None    Number of children: 0    Years of education: None    Highest education level: None   Tobacco Use    Smoking status: Former     Types: Hookah    Smokeless tobacco: Never   Vaping Use    Vaping Use: Never used   Substance and Sexual Activity    Alcohol use: Not Currently    Drug use: Never    Sexual activity: Yes     Partners: Female       ROS:  Constitutional: No fever, chills, or sweats. No weight gain/loss.   ENT: No visual disturbance, ear ache, epistaxis, sore throat.   Allergies/Immunologic: Negative.   Respiratory: No cough, hemoptysis.   Cardiovascular: As per HPI.   GI: No nausea, vomiting, hematemesis, melena, or hematochezia.   : No urinary frequency, dysuria, or hematuria.   Integument: Negative.   Psychiatric: Negative.   Neuro: Negative.   Endocrinology: Negative.   Musculoskeletal: No myalgia.    VITAL SIGNS:  /81 (BP Location: Left arm, Patient Position: Sitting)   Pulse 64   Ht 1.702 m (5' 7\")   Wt 84.9 kg (187 lb 1.6 oz)   SpO2 99%   BMI 29.30 kg/m    Body mass index is 29.3 kg/m .  Wt Readings from Last 2 " Encounters:   08/10/23 84.9 kg (187 lb 1.6 oz)   05/17/23 85.3 kg (188 lb)       PHYSICAL EXAM  Denver Goff IS A 42 year old male.in no acute distress.  HEENT: Unremarkable.  Neck: JVP normal.  Carotids +4/4 bilaterally without bruits.  Lungs: CTA.  Cor: RRR. Normal S1 and S2.  No murmur, rub, or gallop.  PMI in Lf 5th ICS.  Abd: Soft, nontender, nondistended.  NABS.  No pulsatile mass.  Extremities: No C/C/E.  Pulses +4/4 symmetric in upper and lower extremities.  Neuro: Grossly intact.    LABS    Lab Results   Component Value Date    WBC 5.6 03/12/2021     Lab Results   Component Value Date    RBC 5.23 03/12/2021     Lab Results   Component Value Date    HGB 15.5 03/12/2021     Lab Results   Component Value Date    HCT 45.6 03/12/2021     No components found for: MCT  Lab Results   Component Value Date    MCV 87 03/12/2021     Lab Results   Component Value Date    MCH 29.6 03/12/2021     Lab Results   Component Value Date    MCHC 34.0 03/12/2021     Lab Results   Component Value Date    RDW 14.9 03/12/2021     Lab Results   Component Value Date     03/12/2021      Recent Labs   Lab Test 03/12/21  1031 09/05/19  1545    139   POTASSIUM 4.3 4.2   CHLORIDE 107 109   CO2 28 24   ANIONGAP 3 6   GLC 80 76   BUN 15 13   CR 0.85 0.81   LUIGI 9.4 9.1     Recent Labs   Lab Test 07/20/23  0821 05/19/22  0735   CHOL 276* 214*   HDL 50 46   * 123*   TRIG 196* 225*   NHDL 226* 168*        ASSESSMENT AND PLAN:  1) Double chamber RV, 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 Ac) with RV dysfunction and moderate TR and 2021 CPX with cardiac limitation (class III limitation)     2) Hyperlipidemia     Assessment and Plan: Denver is a 42 year old male with history of double chambered right ventricle status post uncomplicated surgical repair. He has been doing well and is currently asymptomatic.  I discussed that I would like to repeat the CPX and will do this in October 2023. We restarted crestor at 5mg  daily and will check lipids and A1C and AST at that time. Echo and follow up in a year.  He will let us know if there are any issues before we see him back.     Thank you for the opportunity to meet Denver. Please don't hesitate to contact me with questions or concerns.     LUANNE Oliver MD      45 minutes face-face and documentation and review on day of visit.

## 2023-10-25 ENCOUNTER — LAB (OUTPATIENT)
Dept: LAB | Facility: CLINIC | Age: 43
End: 2023-10-25
Payer: COMMERCIAL

## 2023-10-25 DIAGNOSIS — Q24.9 ADULT CONGENITAL HEART DISEASE: ICD-10-CM

## 2023-10-25 DIAGNOSIS — E78.5 HYPERLIPIDEMIA LDL GOAL <160: ICD-10-CM

## 2023-10-25 DIAGNOSIS — Q24.8 DOUBLE-CHAMBERED RIGHT VENTRICLE: ICD-10-CM

## 2023-10-25 LAB
AST SERPL W P-5'-P-CCNC: 39 U/L (ref 0–45)
CHOLEST SERPL-MCNC: 204 MG/DL
HBA1C MFR BLD: 5.3 % (ref 0–5.6)
HDLC SERPL-MCNC: 60 MG/DL
LDLC SERPL CALC-MCNC: 122 MG/DL
NONHDLC SERPL-MCNC: 144 MG/DL
TRIGL SERPL-MCNC: 108 MG/DL

## 2023-10-25 PROCEDURE — 84450 TRANSFERASE (AST) (SGOT): CPT

## 2023-10-25 PROCEDURE — 83036 HEMOGLOBIN GLYCOSYLATED A1C: CPT

## 2023-10-25 PROCEDURE — 80061 LIPID PANEL: CPT

## 2023-10-25 PROCEDURE — 36415 COLL VENOUS BLD VENIPUNCTURE: CPT

## 2024-06-04 ENCOUNTER — TELEPHONE (OUTPATIENT)
Dept: CARDIOLOGY | Facility: CLINIC | Age: 44
End: 2024-06-04
Payer: COMMERCIAL

## 2024-06-04 DIAGNOSIS — E78.5 HYPERLIPIDEMIA LDL GOAL <160: ICD-10-CM

## 2024-06-04 RX ORDER — ROSUVASTATIN CALCIUM 5 MG/1
5 TABLET, COATED ORAL DAILY
Qty: 90 TABLET | Refills: 0 | Status: SHIPPED | OUTPATIENT
Start: 2024-06-04 | End: 2024-07-24

## 2024-06-04 NOTE — TELEPHONE ENCOUNTER
I called Denver and asked him if he wanted his Rosuvastatin filled at the [facility] in [place] because I got a 2nd request from there. He said he wants it at [facility]. For some reason he said the last time he filled it he only got 30 day supply. I will resend the prescription.

## 2024-07-14 ENCOUNTER — HEALTH MAINTENANCE LETTER (OUTPATIENT)
Age: 44
End: 2024-07-14

## 2024-07-24 DIAGNOSIS — E78.5 HYPERLIPIDEMIA LDL GOAL <160: ICD-10-CM

## 2024-07-24 DIAGNOSIS — Q24.8 DOUBLE-CHAMBERED RIGHT VENTRICLE: Primary | ICD-10-CM

## 2024-07-24 RX ORDER — ROSUVASTATIN CALCIUM 5 MG/1
5 TABLET, COATED ORAL DAILY
Qty: 90 TABLET | Refills: 1 | Status: SHIPPED | OUTPATIENT
Start: 2024-07-24

## 2024-09-10 ENCOUNTER — MYC REFILL (OUTPATIENT)
Dept: CARDIOLOGY | Facility: CLINIC | Age: 44
End: 2024-09-10
Payer: COMMERCIAL

## 2024-09-10 DIAGNOSIS — E78.5 HYPERLIPIDEMIA LDL GOAL <160: ICD-10-CM

## 2024-09-12 RX ORDER — ROSUVASTATIN CALCIUM 5 MG/1
5 TABLET, COATED ORAL DAILY
Qty: 90 TABLET | Refills: 1 | OUTPATIENT
Start: 2024-09-12

## 2024-09-12 NOTE — TELEPHONE ENCOUNTER
rosuvastatin (CRESTOR) 5 MG tablet 90 tablet 1 7/24/2024 -- No   Sig - Route: Take 1 tablet (5 mg) by mouth daily - Oral

## 2024-10-07 ENCOUNTER — OFFICE VISIT (OUTPATIENT)
Dept: FAMILY MEDICINE | Facility: CLINIC | Age: 44
End: 2024-10-07
Payer: COMMERCIAL

## 2024-10-07 VITALS
HEIGHT: 67 IN | DIASTOLIC BLOOD PRESSURE: 87 MMHG | HEART RATE: 70 BPM | WEIGHT: 198.13 LBS | OXYGEN SATURATION: 100 % | BODY MASS INDEX: 31.1 KG/M2 | TEMPERATURE: 97.6 F | RESPIRATION RATE: 18 BRPM | SYSTOLIC BLOOD PRESSURE: 120 MMHG

## 2024-10-07 DIAGNOSIS — B34.9 VIRAL SYNDROME: ICD-10-CM

## 2024-10-07 DIAGNOSIS — Z12.5 SCREENING FOR PROSTATE CANCER: ICD-10-CM

## 2024-10-07 DIAGNOSIS — K21.9 GASTROESOPHAGEAL REFLUX DISEASE, UNSPECIFIED WHETHER ESOPHAGITIS PRESENT: ICD-10-CM

## 2024-10-07 DIAGNOSIS — E78.5 HYPERLIPIDEMIA LDL GOAL <100: ICD-10-CM

## 2024-10-07 DIAGNOSIS — R53.83 FATIGUE, UNSPECIFIED TYPE: Primary | ICD-10-CM

## 2024-10-07 DIAGNOSIS — J31.0 CHRONIC RHINITIS: ICD-10-CM

## 2024-10-07 DIAGNOSIS — Z13.1 SCREENING FOR DIABETES MELLITUS: ICD-10-CM

## 2024-10-07 LAB
BASOPHILS # BLD AUTO: 0 10E3/UL (ref 0–0.2)
BASOPHILS NFR BLD AUTO: 0 %
EOSINOPHIL # BLD AUTO: 0.1 10E3/UL (ref 0–0.7)
EOSINOPHIL NFR BLD AUTO: 2 %
ERYTHROCYTE [DISTWIDTH] IN BLOOD BY AUTOMATED COUNT: 13.6 % (ref 10–15)
EST. AVERAGE GLUCOSE BLD GHB EST-MCNC: 105 MG/DL
HBA1C MFR BLD: 5.3 % (ref 0–5.6)
HCT VFR BLD AUTO: 45.8 % (ref 40–53)
HGB BLD-MCNC: 15.6 G/DL (ref 13.3–17.7)
IMM GRANULOCYTES # BLD: 0 10E3/UL
IMM GRANULOCYTES NFR BLD: 0 %
LYMPHOCYTES # BLD AUTO: 2.1 10E3/UL (ref 0.8–5.3)
LYMPHOCYTES NFR BLD AUTO: 42 %
MCH RBC QN AUTO: 30.4 PG (ref 26.5–33)
MCHC RBC AUTO-ENTMCNC: 34.1 G/DL (ref 31.5–36.5)
MCV RBC AUTO: 89 FL (ref 78–100)
MONOCYTES # BLD AUTO: 0.5 10E3/UL (ref 0–1.3)
MONOCYTES NFR BLD AUTO: 10 %
NEUTROPHILS # BLD AUTO: 2.2 10E3/UL (ref 1.6–8.3)
NEUTROPHILS NFR BLD AUTO: 45 %
PLATELET # BLD AUTO: 239 10E3/UL (ref 150–450)
RBC # BLD AUTO: 5.13 10E6/UL (ref 4.4–5.9)
WBC # BLD AUTO: 4.9 10E3/UL (ref 4–11)

## 2024-10-07 PROCEDURE — 36415 COLL VENOUS BLD VENIPUNCTURE: CPT | Performed by: FAMILY MEDICINE

## 2024-10-07 PROCEDURE — 80053 COMPREHEN METABOLIC PANEL: CPT | Performed by: FAMILY MEDICINE

## 2024-10-07 PROCEDURE — G0103 PSA SCREENING: HCPCS | Performed by: FAMILY MEDICINE

## 2024-10-07 PROCEDURE — 83036 HEMOGLOBIN GLYCOSYLATED A1C: CPT | Performed by: FAMILY MEDICINE

## 2024-10-07 PROCEDURE — 84443 ASSAY THYROID STIM HORMONE: CPT | Performed by: FAMILY MEDICINE

## 2024-10-07 PROCEDURE — 80061 LIPID PANEL: CPT | Performed by: FAMILY MEDICINE

## 2024-10-07 PROCEDURE — 85025 COMPLETE CBC W/AUTO DIFF WBC: CPT | Performed by: FAMILY MEDICINE

## 2024-10-07 PROCEDURE — 99214 OFFICE O/P EST MOD 30 MIN: CPT | Performed by: FAMILY MEDICINE

## 2024-10-07 ASSESSMENT — PAIN SCALES - GENERAL: PAINLEVEL: NO PAIN (0)

## 2024-10-07 NOTE — PROGRESS NOTES
"      Shanique Goff is a 44 year old, presenting for the following health issues:  Patient Request (Body aches and low grade fevers this past week)        10/7/2024    11:13 AM   Additional Questions   Roomed by Esme Siddiqi     History of Present Illness       Headaches:   Since the patient's last clinic visit, headaches are: no change  The patient is getting headaches:  Everyday  He is not able to do normal daily activities when he has a migraine.  The patient is taking the following rescue/relief medications:  Ibuprofen (Advil, Motrin)   Patient states \"I get some relief\" from the rescue/relief medications.   The patient is taking the following medications to prevent migraines:  No medications to prevent migraines  In the past 4 weeks, the patient has gone to an Urgent Care or Emergency Room 0 times times due to headaches.    Reason for visit:  Low grade fever and body aches  Symptom onset:  3-7 days ago  Symptoms include:  Low grade fever and body aches  Symptom intensity:  Mild  Symptom progression:  Staying the same  Had these symptoms before:  No He is missing 7 dose(s) of medications per week.     Body aches and feeling tired since last week    Low grade fever     Chest discomfort last week    Sometimes with deep breath    Intermittent    No chest pain with exertion    Breathing good    No cough     No runny nose    Bowels and bladder okay    No nausea/ vomiting    No sick contacts     Not short of breath at all    No exercise recently     Works as     Seated mostly    No std concern     Some acid symptoms       Objective    BP (!) 141/88 (BP Location: Right arm, Patient Position: Chair, Cuff Size: Adult Regular)   Pulse 70   Temp 97.6  F (36.4  C) (Temporal)   Resp 18   Ht 1.702 m (5' 7\")   Wt 89.9 kg (198 lb 2 oz)   SpO2 100%   BMI 31.03 kg/m    Body mass index is 31.03 kg/m .  Physical Exam  Constitutional:       Appearance: He is well-developed.   HENT:      Head: " Normocephalic and atraumatic.      Right Ear: Tympanic membrane, ear canal and external ear normal.      Left Ear: Tympanic membrane, ear canal and external ear normal.      Nose: Nose normal.      Mouth/Throat:      Mouth: Mucous membranes are moist.      Pharynx: Oropharynx is clear.   Eyes:      Conjunctiva/sclera: Conjunctivae normal.   Neck:      Vascular: No carotid bruit.   Cardiovascular:      Rate and Rhythm: Normal rate and regular rhythm.      Heart sounds: Normal heart sounds.   Pulmonary:      Effort: Pulmonary effort is normal. No respiratory distress.      Breath sounds: Normal breath sounds.   Abdominal:      Palpations: Abdomen is soft.      Tenderness: There is no abdominal tenderness.   Neurological:      Mental Status: He is alert and oriented to person, place, and time.      Cranial Nerves: No cranial nerve deficit.   Psychiatric:         Speech: Speech normal.         Behavior: Behavior normal.      No edema     Radials symmetric    Labs pending    No sinus/ submandib tenderness       ASSESSMENT / PLAN:  (R53.83) Fatigue, unspecified type  (primary encounter diagnosis)  Comment: check labs   Plan: CBC with Platelets & Differential, TSH with         free T4 reflex             (Z13.1) Screening for diabetes mellitus  Comment: check   Plan: Hemoglobin A1c             (E78.5) Hyperlipidemia LDL goal <100  Comment: check; on statin  Plan: Comprehensive metabolic panel, Lipid panel         reflex to direct LDL Fasting             (Z12.5) Screening for prostate cancer  Comment: psa   Plan: Prostate Specific Antigen Screen             (K21.9) Gastroesophageal reflux disease, unspecified whether esophagitis present  Comment: advised trial of over the counter famotidine   Plan: follow up prn     (B34.9) Viral syndrome  Comment: likely viral.  Reassuring exam and vitals.   Plan: follow up prn symptoms     (J31.0) Chronic rhinitis  Comment: over the counter flonase working fine   Plan: as above       I  reviewed the patient's medications, allergies, medical history, family history, and social history.    Porter Pritchett MD          Signed Electronically by: Porter Pritchett MD

## 2024-10-07 NOTE — PATIENT INSTRUCTIONS
Keep working on healthy diet/exercise and weight loss    We will send you lab results    Call/ return to clinic as needed based on symptoms     Could try over the counter famotidine ( pepcid ) to see if this helps the acid symptoms

## 2024-10-08 LAB
ALBUMIN SERPL BCG-MCNC: 4.4 G/DL (ref 3.5–5.2)
ALP SERPL-CCNC: 103 U/L (ref 40–150)
ALT SERPL W P-5'-P-CCNC: 32 U/L (ref 0–70)
ANION GAP SERPL CALCULATED.3IONS-SCNC: 12 MMOL/L (ref 7–15)
AST SERPL W P-5'-P-CCNC: 33 U/L (ref 0–45)
BILIRUB SERPL-MCNC: 0.3 MG/DL
BUN SERPL-MCNC: 12.4 MG/DL (ref 6–20)
CALCIUM SERPL-MCNC: 9.3 MG/DL (ref 8.8–10.4)
CHLORIDE SERPL-SCNC: 104 MMOL/L (ref 98–107)
CHOLEST SERPL-MCNC: 192 MG/DL
CREAT SERPL-MCNC: 0.7 MG/DL (ref 0.67–1.17)
EGFRCR SERPLBLD CKD-EPI 2021: >90 ML/MIN/1.73M2
FASTING STATUS PATIENT QL REPORTED: YES
FASTING STATUS PATIENT QL REPORTED: YES
GLUCOSE SERPL-MCNC: 91 MG/DL (ref 70–99)
HCO3 SERPL-SCNC: 23 MMOL/L (ref 22–29)
HDLC SERPL-MCNC: 57 MG/DL
LDLC SERPL CALC-MCNC: 104 MG/DL
NONHDLC SERPL-MCNC: 135 MG/DL
POTASSIUM SERPL-SCNC: 4.5 MMOL/L (ref 3.4–5.3)
PROT SERPL-MCNC: 7.4 G/DL (ref 6.4–8.3)
PSA SERPL DL<=0.01 NG/ML-MCNC: 1.14 NG/ML (ref 0–2.5)
SODIUM SERPL-SCNC: 139 MMOL/L (ref 135–145)
TRIGL SERPL-MCNC: 155 MG/DL
TSH SERPL DL<=0.005 MIU/L-ACNC: 1.93 UIU/ML (ref 0.3–4.2)

## 2024-10-08 NOTE — RESULT ENCOUNTER NOTE
"Triglycerides and LDL \"bad\" cholesterol are slightly high.  Keep working on healthy diet/exercise.     Other labs are fine.      Normal hemoglobin a1c means no diabetes or prediabetes.    Porter Pritchett MD  "

## 2024-10-09 ENCOUNTER — APPOINTMENT (OUTPATIENT)
Dept: GENERAL RADIOLOGY | Facility: CLINIC | Age: 44
End: 2024-10-09
Attending: PHYSICIAN ASSISTANT
Payer: COMMERCIAL

## 2024-10-09 ENCOUNTER — HOSPITAL ENCOUNTER (EMERGENCY)
Facility: CLINIC | Age: 44
Discharge: HOME | End: 2024-10-09
Attending: PHYSICIAN ASSISTANT | Admitting: PHYSICIAN ASSISTANT
Payer: COMMERCIAL

## 2024-10-09 VITALS
WEIGHT: 193 LBS | OXYGEN SATURATION: 99 % | HEART RATE: 88 BPM | HEIGHT: 67 IN | BODY MASS INDEX: 30.29 KG/M2 | DIASTOLIC BLOOD PRESSURE: 85 MMHG | TEMPERATURE: 97.6 F | RESPIRATION RATE: 20 BRPM | SYSTOLIC BLOOD PRESSURE: 121 MMHG

## 2024-10-09 DIAGNOSIS — M54.9 BACK PAIN: ICD-10-CM
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
## 2024-10-09 DIAGNOSIS — M54.2 NECK PAIN: ICD-10-CM

## 2024-10-09 PROCEDURE — 99283 EMERGENCY DEPT VISIT LOW MDM: CPT

## 2024-10-09 PROCEDURE — 72072 X-RAY EXAM THORAC SPINE 3VWS: CPT

## 2024-10-09 ASSESSMENT — ACTIVITIES OF DAILY LIVING (ADL)
ADLS_ACUITY_SCORE: 35

## 2024-10-09 ASSESSMENT — COLUMBIA-SUICIDE SEVERITY RATING SCALE - C-SSRS
6. HAVE YOU EVER DONE ANYTHING, STARTED TO DO ANYTHING, OR PREPARED TO DO ANYTHING TO END YOUR LIFE?: NO
1. IN THE PAST MONTH, HAVE YOU WISHED YOU WERE DEAD OR WISHED YOU COULD GO TO SLEEP AND NOT WAKE UP?: NO
2. HAVE YOU ACTUALLY HAD ANY THOUGHTS OF KILLING YOURSELF IN THE PAST MONTH?: NO
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
## 2025-02-10 NOTE — PATIENT INSTRUCTIONS
"Thank you for visiting the Adult Congenital and Cardiovascular Genetics Clinic at the [facility].    Cardiology Providers you saw during your visit:  LUANNE Oliver MD    Diagnosis:  double chambered right ventricle     Results:  LUANNE Oliver MD reviewed the results of your echocardiogram, EKG and lab testing today in clinic.    If you have questions or concerns, please call us at [phone] or contact us through [employer].  ______________________________________________________________________________    Recommendations from your Cardiology Provider TODAY:    We have refilled your medications.       ____________________________________________________________________________________________________    Follow-up Plan:  Follow-up with Dr. Oliver in 1 year with an echocardiogram prior.     ____________________________________________________________________________________________________    If you have questions or concerns, please call us at [phone] or contact us through [employer].    Kendra Alexandre RN, BSN    Zohreh Coleman (Scheduling)  Nurse Care Coordinator     Clinic   Adult Congenital and CV Genetics  Adult Congenital   [facility]  [facility]  (P) [phone]     (P) [phone]  (F) [phone] (E) [phone]        For after hours urgent needs, call [phone] and ask to speak to the \"On-Call Cardiologist.\"    For emergencies call [phone].    ____________________________________________________________________________________________________    Additional Important Information for Your Heart Health      General Cardiac Recommendations:  Continue to eat a heart healthy, low salt diet.  Continue to get 20-30 minutes of aerobic activity, 4-5 days per week.  Examples of aerobic activity include walking, running, swimming, cycling, etc.  Continue to observe good oral hygiene, with regular dental visits.        SBE " "prophylaxis (\"Do you need antibiotics before dentist appointments?\"):   Yes____  No__X__    SBE prophylaxis applies to patients with certain heart conditions who are recommended to take antibiotics before dental appointments and other specific procedures. These antibiotics are to help prevent an infection of the heart (endocarditis) that certain patients are at higher risk of developing. The guidelines used come from the American Heart Association and are periodically updated.        FASTING CHOLESTEROL was checked in the last 5 years YES__X__  NO____ (2024)  If no, please follow up with your primary care physician. You should have a cholesterol screening every 5 years at minimum, and every year if taking a medication for your cholesterol levels.     "

## 2025-02-11 ENCOUNTER — LAB (OUTPATIENT)
Dept: LAB | Facility: CLINIC | Age: 45
End: 2025-02-11
Attending: INTERNAL MEDICINE
Payer: COMMERCIAL

## 2025-02-11 ENCOUNTER — OFFICE VISIT (OUTPATIENT)
Dept: CARDIOLOGY | Facility: CLINIC | Age: 45
End: 2025-02-11
Attending: INTERNAL MEDICINE
Payer: COMMERCIAL

## 2025-02-11 VITALS
SYSTOLIC BLOOD PRESSURE: 124 MMHG | DIASTOLIC BLOOD PRESSURE: 85 MMHG | WEIGHT: 197 LBS | HEART RATE: 64 BPM | OXYGEN SATURATION: 99 % | BODY MASS INDEX: 30.85 KG/M2

## 2025-02-11 DIAGNOSIS — Q24.8 DOUBLE-CHAMBERED RIGHT VENTRICLE: ICD-10-CM

## 2025-02-11 DIAGNOSIS — Q24.9 ADULT CONGENITAL HEART DISEASE: ICD-10-CM

## 2025-02-11 DIAGNOSIS — E78.5 HYPERLIPIDEMIA LDL GOAL <160: ICD-10-CM

## 2025-02-11 LAB
ALBUMIN SERPL BCG-MCNC: 4.2 G/DL (ref 3.5–5.2)
ALP SERPL-CCNC: 106 U/L (ref 40–150)
ALT SERPL W P-5'-P-CCNC: 26 U/L (ref 0–70)
ANION GAP SERPL CALCULATED.3IONS-SCNC: 8 MMOL/L (ref 7–15)
AST SERPL W P-5'-P-CCNC: 30 U/L (ref 0–45)
ATRIAL RATE - MUSE: 60 BPM
BASOPHILS # BLD AUTO: 0 10E3/UL (ref 0–0.2)
BASOPHILS NFR BLD AUTO: 0 %
BILIRUB SERPL-MCNC: 0.3 MG/DL
BUN SERPL-MCNC: 18.8 MG/DL (ref 6–20)
CALCIUM SERPL-MCNC: 9.2 MG/DL (ref 8.8–10.4)
CHLORIDE SERPL-SCNC: 107 MMOL/L (ref 98–107)
CHOLEST SERPL-MCNC: 175 MG/DL
CREAT SERPL-MCNC: 0.93 MG/DL (ref 0.67–1.17)
DIASTOLIC BLOOD PRESSURE - MUSE: NORMAL MMHG
EGFRCR SERPLBLD CKD-EPI 2021: >90 ML/MIN/1.73M2
EOSINOPHIL # BLD AUTO: 0.5 10E3/UL (ref 0–0.7)
EOSINOPHIL NFR BLD AUTO: 10 %
ERYTHROCYTE [DISTWIDTH] IN BLOOD BY AUTOMATED COUNT: 13.5 % (ref 10–15)
FASTING STATUS PATIENT QL REPORTED: YES
FASTING STATUS PATIENT QL REPORTED: YES
GLUCOSE SERPL-MCNC: 104 MG/DL (ref 70–99)
HCO3 SERPL-SCNC: 25 MMOL/L (ref 22–29)
HCT VFR BLD AUTO: 45.4 % (ref 40–53)
HDLC SERPL-MCNC: 51 MG/DL
HGB BLD-MCNC: 15.6 G/DL (ref 13.3–17.7)
IMM GRANULOCYTES # BLD: 0 10E3/UL
IMM GRANULOCYTES NFR BLD: 0 %
INTERPRETATION ECG - MUSE: NORMAL
LDLC SERPL CALC-MCNC: 93 MG/DL
LYMPHOCYTES # BLD AUTO: 2.2 10E3/UL (ref 0.8–5.3)
LYMPHOCYTES NFR BLD AUTO: 42 %
MCH RBC QN AUTO: 29.8 PG (ref 26.5–33)
MCHC RBC AUTO-ENTMCNC: 34.4 G/DL (ref 31.5–36.5)
MCV RBC AUTO: 87 FL (ref 78–100)
MONOCYTES # BLD AUTO: 0.6 10E3/UL (ref 0–1.3)
MONOCYTES NFR BLD AUTO: 12 %
NEUTROPHILS # BLD AUTO: 1.8 10E3/UL (ref 1.6–8.3)
NEUTROPHILS NFR BLD AUTO: 35 %
NONHDLC SERPL-MCNC: 124 MG/DL
NRBC # BLD AUTO: 0 10E3/UL
NRBC BLD AUTO-RTO: 0 /100
P AXIS - MUSE: 12 DEGREES
PLATELET # BLD AUTO: 239 10E3/UL (ref 150–450)
POTASSIUM SERPL-SCNC: 4.7 MMOL/L (ref 3.4–5.3)
PR INTERVAL - MUSE: 218 MS
PROT SERPL-MCNC: 7.2 G/DL (ref 6.4–8.3)
QRS DURATION - MUSE: 146 MS
QT - MUSE: 432 MS
QTC - MUSE: 432 MS
R AXIS - MUSE: 24 DEGREES
RBC # BLD AUTO: 5.23 10E6/UL (ref 4.4–5.9)
SODIUM SERPL-SCNC: 140 MMOL/L (ref 135–145)
SYSTOLIC BLOOD PRESSURE - MUSE: NORMAL MMHG
T AXIS - MUSE: 31 DEGREES
TRIGL SERPL-MCNC: 156 MG/DL
VENTRICULAR RATE- MUSE: 60 BPM
WBC # BLD AUTO: 5.2 10E3/UL (ref 4–11)

## 2025-02-11 PROCEDURE — G0463 HOSPITAL OUTPT CLINIC VISIT: HCPCS | Performed by: INTERNAL MEDICINE

## 2025-02-11 PROCEDURE — 80053 COMPREHEN METABOLIC PANEL: CPT | Performed by: PATHOLOGY

## 2025-02-11 PROCEDURE — 85025 COMPLETE CBC W/AUTO DIFF WBC: CPT | Performed by: PATHOLOGY

## 2025-02-11 PROCEDURE — 36415 COLL VENOUS BLD VENIPUNCTURE: CPT | Performed by: PATHOLOGY

## 2025-02-11 PROCEDURE — 99214 OFFICE O/P EST MOD 30 MIN: CPT | Performed by: INTERNAL MEDICINE

## 2025-02-11 PROCEDURE — 93005 ELECTROCARDIOGRAM TRACING: CPT

## 2025-02-11 PROCEDURE — 80061 LIPID PANEL: CPT | Performed by: PATHOLOGY

## 2025-02-11 RX ORDER — ROSUVASTATIN CALCIUM 5 MG/1
5 TABLET, COATED ORAL DAILY
Qty: 90 TABLET | Refills: 3 | Status: SHIPPED | OUTPATIENT
Start: 2025-02-11

## 2025-02-11 ASSESSMENT — PAIN SCALES - GENERAL: PAINLEVEL_OUTOF10: NO PAIN (0)

## 2025-02-11 NOTE — LETTER
2/11/2025      RE: Denver Goff  [address]  [facility] [address]       Dear Colleague,    Thank you for the opportunity to participate in the care of your patient, Denver Goff, at the [facility] [place] [facility]. Please see a copy of my visit note below.    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 Ac); surgery was uncomplicated except for possible arrhythmia that is now resolved.      He was being followed by Dr. Grigsby before moving to [place] and establishing care in our clinic 10/2019.  Denver has not had any fatigue, exercise intolerance, diaphoresis, tachycardia, chest pain, dyspnea, syncope, dizziness, palpitations, cyanosis, edema.  He continues to exercise - walks 4-5 miles a day. His weight has been stable.  He is a bit frustrated that he has not lost more weight, but is thinking about watching his calories more.      He had an LDL up to 187 in the past and it is well controlled now on just Crestor 5 mg daily.  He has no history of CAD and no known family history of early CAD.  A1C 10/2024 was normal.     Echo 2/2025 shows RV enlargement in the moderate range without any significant dysfunction. He has mild TR.   He had a cardiac MRI 7/2023 and that confirmed the echo finding with no RV outflow obstruction.  He underwent a CPX in 2021. His RER was 1.4.  VE/VC02 was 33. V02 13.5.  He has not completed that study yet, but will likely do this summer.      Family history is negative for congenital heart disease or acquired structural heart disease, but for his sister who had a child with a hole in the heart. He is now  and he and his wife are trying to start a family. We discussed fetal echo at 20 weeks if she becomes pregnant.     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 He used to smoke from age 24 until 32.  His mom and his sister are here in [facility].        PAST MEDICAL HISTORY:  Past Medical History:   Diagnosis Date     Congenital heart anomaly        CURRENT MEDICATIONS:  Current Outpatient Medications   Medication Sig Dispense Refill     fluticasone (FLONASE) 50 MCG/ACT nasal spray SPRAY 2 SPRAYS INTO BOTH NOSTRILS DAILY. 16 g 1     rosuvastatin (CRESTOR) 5 MG tablet Take 1 tablet (5 mg) by mouth daily 90 tablet 1       PAST SURGICAL HISTORY:  Past Surgical History:   Procedure Laterality Date     CARDIAC SURGERY      repair of double chamber left ventricle aged 28     CYSTOSCOPY, RETROGRADES, EXTRACT STONE, INSERT STENT, COMBINED Left 2019    Procedure: Cystoscopy, left retrograde pyelogram, interpretation of fluoroscopic images, left ureteroscopy with stone basketing, placement of 5 x 26 double-J left ureteral stent, laser on stand-by;  Surgeon: Gavino Delarosa MD;  Location: [facility] OR       ALLERGIES  No known allergies    FAMILY HX:  Family History   Problem Relation Age of Onset     Hypertension Mother      Hyperlipidemia Father      Lung Cancer Father          68 smoker       SOCIAL HX:  Social History     Socioeconomic History     Marital status:      Spouse name: None     Number of children: 0     Years of education: None     Highest education level: None   Tobacco Use     Smoking status: Former     Types: Hookah     Passive exposure: Never     Smokeless tobacco: Never   Vaping Use     Vaping status: Never Used   Substance and Sexual Activity     Alcohol use: Not Currently     Drug use: Never     Sexual activity: Yes     Partners: Female     Social Drivers of Health     Interpersonal Safety: Low Risk  (10/7/2024)    Interpersonal Safety      Do you feel physically and emotionally safe where you currently live?: Yes      Within the past 12 months, have you been hit, slapped, kicked or otherwise physically  "hurt by someone?: No      Within the past 12 months, have you been humiliated or emotionally abused in other ways by your partner or ex-partner?: No       ROS:  Constitutional: No fever, chills, or sweats. No weight gain/loss.   ENT: No visual disturbance, ear ache, epistaxis, sore throat.   Allergies/Immunologic: Negative.   Respiratory: No cough, hemoptysis.   Cardiovascular: As per HPI.   GI: No nausea, vomiting, hematemesis, melena, or hematochezia.   : No urinary frequency, dysuria, or hematuria.   Integument: Negative.   Psychiatric: Negative.   Neuro: Negative.   Endocrinology: Negative.   Musculoskeletal: No myalgia.    VITAL SIGNS:  /85 (BP Location: Right arm, Patient Position: Chair, Cuff Size: Adult Regular)   Pulse 64   Wt 89.4 kg (197 lb)   SpO2 99%   BMI 30.85 kg/m    Body mass index is 30.85 kg/m .  Wt Readings from Last 2 Encounters:   02/11/25 89.4 kg (197 lb)   10/09/24 87.5 kg (193 lb)       PHYSICAL EXAM  Denver Goff IS A 44 year old male.in no acute distress.  HEENT: Unremarkable.  Neck: JVP normal.   Lungs: CTA.  Cor: RRR. Normal S1 and S2.  Soft holosystolic murmur.  Abd: Soft.  Extremities: No C/C/E.    Neuro: Grossly intact.    LABS    Lab Results   Component Value Date    WBC 5.2 02/11/2025    WBC 5.6 03/12/2021     Lab Results   Component Value Date    RBC 5.23 02/11/2025    RBC 5.23 03/12/2021     Lab Results   Component Value Date    HGB 15.6 02/11/2025    HGB 15.5 03/12/2021     Lab Results   Component Value Date    HCT 45.4 02/11/2025    HCT 45.6 03/12/2021     No components found for: \"MCT\"  Lab Results   Component Value Date    MCV 87 02/11/2025    MCV 87 03/12/2021     Lab Results   Component Value Date    MCH 29.8 02/11/2025    MCH 29.6 03/12/2021     Lab Results   Component Value Date    MCHC 34.4 02/11/2025    MCHC 34.0 03/12/2021     Lab Results   Component Value Date    RDW 13.5 02/11/2025    RDW 14.9 03/12/2021     Lab Results   Component Value Date    "  02/11/2025     03/12/2021      Recent Labs   Lab Test 02/11/25  0712 10/07/24  1202    139   POTASSIUM 4.7 4.5   CHLORIDE 107 104   CO2 25 23   ANIONGAP 8 12   * 91   BUN 18.8 12.4   CR 0.93 0.70   LUIGI 9.2 9.3     Recent Labs   Lab Test 02/11/25  0712 10/07/24  1202   CHOL 175 192   HDL 51 57   LDL 93 104*   TRIG 156* 155*   NHDL 124 135*        ASSESSMENT AND PLAN:  1) Double chamber RV, 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 Ac) with RV dysfunction and moderate TR and 2021 CPX with cardiac limitation (class III limitation)  2) Hyperlipidemia     Assessment and Plan: Denver is a 42 year old male with history of double chambered right ventricle status post uncomplicated surgical repair. He has been doing well and is currently asymptomatic.  I discussed that I would like to repeat the CPX and he will schedule for this summer he thinks.  He will continue to work on diet and exercise.  Echo and follow up in a year.  He will let us know if there are any issues before we see him back.     Thank you for the opportunity to meet Denver. Please don't hesitate to contact me with questions or concerns.     LUANNE Oliver MD      32 minutes face-face and documentation and review on day of visit.    Please do not hesitate to contact me if you have any questions/concerns.     Sincerely,     Ronaldo Oliver MD

## 2025-02-11 NOTE — PROGRESS NOTES
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 Ac); surgery was uncomplicated except for possible arrhythmia that is now resolved.      He was being followed by Dr. Grigsby before moving to [place] and establishing care in our clinic 10/2019.  Denver has not had any fatigue, exercise intolerance, diaphoresis, tachycardia, chest pain, dyspnea, syncope, dizziness, palpitations, cyanosis, edema.  He continues to exercise - walks 4-5 miles a day. His weight has been stable.  He is a bit frustrated that he has not lost more weight, but is thinking about watching his calories more.      He had an LDL up to 187 in the past and it is well controlled now on just Crestor 5 mg daily.  He has no history of CAD and no known family history of early CAD.  A1C 10/2024 was normal.     Echo 2/2025 shows RV enlargement in the moderate range without any significant dysfunction. He has mild TR.   He had a cardiac MRI 7/2023 and that confirmed the echo finding with no RV outflow obstruction.  He underwent a CPX in 2021. His RER was 1.4.  VE/VC02 was 33. V02 13.5.  He has not completed that study yet, but will likely do this summer.      Family history is negative for congenital heart disease or acquired structural heart disease, but for his sister who had a child with a hole in the heart. He is now  and he and his wife are trying to start a family. We discussed fetal echo at 20 weeks if she becomes pregnant.     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 He used to smoke from age 24 until 32.  His mom and his sister are here in [place]".        PAST MEDICAL HISTORY:  Past Medical History:   Diagnosis Date    Congenital heart anomaly        CURRENT MEDICATIONS:  Current Outpatient Medications   Medication Sig Dispense Refill    fluticasone (FLONASE) 50 MCG/ACT nasal spray  SPRAY 2 SPRAYS INTO BOTH NOSTRILS DAILY. 16 g 1    rosuvastatin (CRESTOR) 5 MG tablet Take 1 tablet (5 mg) by mouth daily 90 tablet 1       PAST SURGICAL HISTORY:  Past Surgical History:   Procedure Laterality Date    CARDIAC SURGERY      repair of double chamber left ventricle aged 28    CYSTOSCOPY, RETROGRADES, EXTRACT STONE, INSERT STENT, COMBINED Left 2019    Procedure: Cystoscopy, left retrograde pyelogram, interpretation of fluoroscopic images, left ureteroscopy with stone basketing, placement of 5 x 26 double-J left ureteral stent, laser on stand-by;  Surgeon: Gavino Delarosa MD;  Location: [facility] OR       ALLERGIES  No known allergies    FAMILY HX:  Family History   Problem Relation Age of Onset    Hypertension Mother     Hyperlipidemia Father     Lung Cancer Father          68 smoker       SOCIAL HX:  Social History     Socioeconomic History    Marital status:      Spouse name: None    Number of children: 0    Years of education: None    Highest education level: None   Tobacco Use    Smoking status: Former     Types: Hookah     Passive exposure: Never    Smokeless tobacco: Never   Vaping Use    Vaping status: Never Used   Substance and Sexual Activity    Alcohol use: Not Currently    Drug use: Never    Sexual activity: Yes     Partners: Female     Social Drivers of Health     Interpersonal Safety: Low Risk  (10/7/2024)    Interpersonal Safety     Do you feel physically and emotionally safe where you currently live?: Yes     Within the past 12 months, have you been hit, slapped, kicked or otherwise physically hurt by someone?: No     Within the past 12 months, have you been humiliated or emotionally abused in other ways by your partner or ex-partner?: No       ROS:  Constitutional: No fever, chills, or sweats. No weight gain/loss.   ENT: No visual disturbance, ear ache, epistaxis, sore throat.   Allergies/Immunologic: Negative.   Respiratory: No cough, hemoptysis.   Cardiovascular: As  "per HPI.   GI: No nausea, vomiting, hematemesis, melena, or hematochezia.   : No urinary frequency, dysuria, or hematuria.   Integument: Negative.   Psychiatric: Negative.   Neuro: Negative.   Endocrinology: Negative.   Musculoskeletal: No myalgia.    VITAL SIGNS:  /85 (BP Location: Right arm, Patient Position: Chair, Cuff Size: Adult Regular)   Pulse 64   Wt 89.4 kg (197 lb)   SpO2 99%   BMI 30.85 kg/m    Body mass index is 30.85 kg/m .  Wt Readings from Last 2 Encounters:   02/11/25 89.4 kg (197 lb)   10/09/24 87.5 kg (193 lb)       PHYSICAL EXAM  Denver Goff IS A 44 year old male.in no acute distress.  HEENT: Unremarkable.  Neck: JVP normal.   Lungs: CTA.  Cor: RRR. Normal S1 and S2.  Soft holosystolic murmur.  Abd: Soft.  Extremities: No C/C/E.    Neuro: Grossly intact.    LABS    Lab Results   Component Value Date    WBC 5.2 02/11/2025    WBC 5.6 03/12/2021     Lab Results   Component Value Date    RBC 5.23 02/11/2025    RBC 5.23 03/12/2021     Lab Results   Component Value Date    HGB 15.6 02/11/2025    HGB 15.5 03/12/2021     Lab Results   Component Value Date    HCT 45.4 02/11/2025    HCT 45.6 03/12/2021     No components found for: \"MCT\"  Lab Results   Component Value Date    MCV 87 02/11/2025    MCV 87 03/12/2021     Lab Results   Component Value Date    MCH 29.8 02/11/2025    MCH 29.6 03/12/2021     Lab Results   Component Value Date    MCHC 34.4 02/11/2025    MCHC 34.0 03/12/2021     Lab Results   Component Value Date    RDW 13.5 02/11/2025    RDW 14.9 03/12/2021     Lab Results   Component Value Date     02/11/2025     03/12/2021      Recent Labs   Lab Test 02/11/25  0712 10/07/24  1202    139   POTASSIUM 4.7 4.5   CHLORIDE 107 104   CO2 25 23   ANIONGAP 8 12   * 91   BUN 18.8 12.4   CR 0.93 0.70   LUIGI 9.2 9.3     Recent Labs   Lab Test 02/11/25  0712 10/07/24  1202   CHOL 175 192   HDL 51 57   LDL 93 104*   TRIG 156* 155*   NHDL 124 135*    "     ASSESSMENT AND PLAN:  1) Double chamber RV, 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 Ac) with RV dysfunction and moderate TR and 2021 CPX with cardiac limitation (class III limitation)  2) Hyperlipidemia     Assessment and Plan: Denver is a 42 year old male with history of double chambered right ventricle status post uncomplicated surgical repair. He has been doing well and is currently asymptomatic.  I discussed that I would like to repeat the CPX and he will schedule for this summer he thinks.  He will continue to work on diet and exercise.  Echo and follow up in a year.  He will let us know if there are any issues before we see him back.     Thank you for the opportunity to meet Denver. Please don't hesitate to contact me with questions or concerns.     LUANNE Oliver MD      32 minutes face-face and documentation and review on day of visit.

## 2025-02-11 NOTE — NURSING NOTE
Chief Complaint   Patient presents with    Follow Up     44 year old male with history of double-chambered right ventricle, with release, and hyperlipidemia presenting for follow up.       Vitals were taken, medications reconciled and EKG performed.    Jaden Mitchell, Clinic Assistant    7:38 AM

## 2025-06-01 SDOH — HEALTH STABILITY: PHYSICAL HEALTH: ON AVERAGE, HOW MANY DAYS PER WEEK DO YOU ENGAGE IN MODERATE TO STRENUOUS EXERCISE (LIKE A BRISK WALK)?: 6 DAYS

## 2025-06-01 SDOH — HEALTH STABILITY: PHYSICAL HEALTH: ON AVERAGE, HOW MANY MINUTES DO YOU ENGAGE IN EXERCISE AT THIS LEVEL?: 60 MIN

## 2025-06-01 ASSESSMENT — SOCIAL DETERMINANTS OF HEALTH (SDOH): HOW OFTEN DO YOU GET TOGETHER WITH FRIENDS OR RELATIVES?: MORE THAN THREE TIMES A WEEK

## 2025-06-02 VITALS
RESPIRATION RATE: 16 BRPM | OXYGEN SATURATION: 96 % | HEIGHT: 67 IN | DIASTOLIC BLOOD PRESSURE: 76 MMHG | BODY MASS INDEX: 32.33 KG/M2 | HEART RATE: 67 BPM | WEIGHT: 206 LBS | TEMPERATURE: 97.1 F | SYSTOLIC BLOOD PRESSURE: 118 MMHG

## 2025-06-02 DIAGNOSIS — Z11.4 SCREENING FOR HIV (HUMAN IMMUNODEFICIENCY VIRUS): ICD-10-CM

## 2025-06-02 DIAGNOSIS — Z00.00 ROUTINE GENERAL MEDICAL EXAMINATION AT A HEALTH CARE FACILITY: Primary | ICD-10-CM

## 2025-06-02 DIAGNOSIS — E78.00 HYPERCHOLESTEREMIA: ICD-10-CM

## 2025-06-02 DIAGNOSIS — Q24.9 CONGENITAL HEART DISEASE: ICD-10-CM

## 2025-06-02 DIAGNOSIS — Z11.59 NEED FOR HEPATITIS C SCREENING TEST: ICD-10-CM

## 2025-06-02 PROCEDURE — 90471 IMMUNIZATION ADMIN: CPT | Performed by: INTERNAL MEDICINE

## 2025-06-02 PROCEDURE — 3078F DIAST BP <80 MM HG: CPT | Performed by: INTERNAL MEDICINE

## 2025-06-02 PROCEDURE — 3074F SYST BP LT 130 MM HG: CPT | Performed by: INTERNAL MEDICINE

## 2025-06-02 PROCEDURE — 90715 TDAP VACCINE 7 YRS/> IM: CPT | Performed by: INTERNAL MEDICINE

## 2025-06-02 NOTE — PATIENT INSTRUCTIONS
Consider FLU and COVID this fall.    Labs next year, including prostate specific antigen (PSA), diabetes blood test (A1c), LDL cholesterol, and electrolytes, liver, and kidney panels.    No change in your crestor medication.    Try to limit complex carbs (rice, bread, pasta, potatoes, cereals) and simple carbs (pop, juice, alcohol, and even milk.)  Eating more lean proteins (chicken, fish, eggs, beans, nuts) and unlimited vegetables and fruits can definitely help as well.     In general, try to spread meals out during the day, eating smaller breakfasts, lunches and dinners--and having healthy snacks in between.  It's a good idea to limit your carbs at mealtimes to 60-75 grams of carbs, and to limit your carbs at snacktimes to 15-30 grams of carbs.  (Check the food labels to add up these carbs.)           Patient Education   Preventive Care Advice   This is general advice given by our system to help you stay healthy. However, your care team may have specific advice just for you. Please talk to your care team about your preventive care needs.  Nutrition  Eat 5 or more servings of fruits and vegetables each day.  Try wheat bread, brown rice and whole grain pasta (instead of white bread, rice, and pasta).  Get enough calcium and vitamin D. Check the label on foods and aim for 100% of the RDA (recommended daily allowance).  Lifestyle  Exercise at least 150 minutes each week  (30 minutes a day, 5 days a week).  Do muscle strengthening activities 2 days a week. These help control your weight and prevent disease.  No smoking.  Wear sunscreen to prevent skin cancer.  Have a dental exam and cleaning every 6 months.  Yearly exams  See your health care team every year to talk about:  Any changes in your health.  Any medicines your care team has prescribed.  Preventive care, family planning, and ways to prevent chronic diseases.  Shots (vaccines)   HPV shots (up to age 26), if you've never had them before.  Hepatitis B shots (up  to age 59), if you've never had them before.  COVID-19 shot: Get this shot when it's due.  Flu shot: Get a flu shot every year.  Tetanus shot: Get a tetanus shot every 10 years.  Pneumococcal, hepatitis A, and RSV shots: Ask your care team if you need these based on your risk.  Shingles shot (for age 50 and up)  General health tests  Diabetes screening:  Starting at age 35, Get screened for diabetes at least every 3 years.  If you are younger than age 35, ask your care team if you should be screened for diabetes.  Cholesterol test: At age 39, start having a cholesterol test every 5 years, or more often if advised.  Bone density scan (DEXA): At age 50, ask your care team if you should have this scan for osteoporosis (brittle bones).  Hepatitis C: Get tested at least once in your life.  STIs (sexually transmitted infections)  Before age 24: Ask your care team if you should be screened for STIs.  After age 24: Get screened for STIs if you're at risk. You are at risk for STIs (including HIV) if:  You are sexually active with more than one person.  You don't use condoms every time.  You or a partner was diagnosed with a sexually transmitted infection.  If you are at risk for HIV, ask about PrEP medicine to prevent HIV.  Get tested for HIV at least once in your life, whether you are at risk for HIV or not.  Cancer screening tests  Cervical cancer screening: If you have a cervix, begin getting regular cervical cancer screening tests starting at age 21.  Breast cancer scan (mammogram): If you've ever had breasts, begin having regular mammograms starting at age 40. This is a scan to check for breast cancer.  Colon cancer screening: It is important to start screening for colon cancer at age 45.  Have a colonoscopy test every 10 years (or more often if you're at risk) Or, ask your provider about stool tests like a FIT test every year or Cologuard test every 3 years.  To learn more about your testing options, visit:   .  For  help making a decision, visit:   https://bit.[employer]/dc11294.  Prostate cancer screening test: If you have a prostate, ask your care team if a prostate cancer screening test (PSA) at age 55 is right for you.  Lung cancer screening: If you are a current or former smoker ages 50 to 80, ask your care team if ongoing lung cancer screenings are right for you.  For informational purposes only. Not to replace the advice of your health care provider. Copyright   2023 [facility]. All rights reserved. Clinically reviewed by the [facility] Transitions Program. [employer] 830057 - REV 01/24.

## 2025-06-02 NOTE — PROGRESS NOTES
"Preventive Care Visit  [facility] MELVIN Lofton MD, Internal Medicine - Pediatrics  Jun 2, 2025      Assessment & Plan       Congenital heart disease  S/p surgical repair.  Followed by cardiology.     Hypercholesteremia   On statin.     Routine general medical examination at a health care facility  Discussed diet, exercise, testicular self exam, blood pressure, cholesterol, and need for cancer surveillance at appropriate ages.    Appropriate preventive services were addressed with this patient via screening, questionnaire, or discussion as appropriate for fall prevention, nutrition, physical activity, Tobacco-use cessation, social engagement, weight loss and cognition.  Checklist reviewing preventive services available has been given to the patient.  Reviewed patient's diet, addressing concerns and/or questions.      Patient has been advised of split billing requirements and indicates understanding: Yes        BMI  Estimated body mass index is 32.26 kg/m  as calculated from the following:    Height as of this encounter: 1.702 m (5' 7\").    Weight as of this encounter: 93.4 kg (206 lb).   Weight management plan: Discussed healthy diet and exercise guidelines    Counseling  Appropriate preventive services were addressed with this patient via screening, questionnaire, or discussion as appropriate for fall prevention, nutrition, physical activity, Tobacco-use cessation, social engagement, weight loss and cognition.  Checklist reviewing preventive services available has been given to the patient.  Reviewed patient's diet, addressing concerns and/or questions.   The patient was instructed to see the dentist every 6 months.           Shanique Goff is a 44 year old, presenting for the following:  Physical (Establish care )        6/2/2025     2:46 PM   Additional Questions   Roomed by Licha Tapia   Accompanied by n/a         6/2/2025     2:46 PM   Patient Reported Additional Medications   Patient " reports taking the following new medications None          HPI  Bad cold 3 weeks ago.  Sore throat and cough. Improved over 2 weeks, then sore throat last few days.     Works as a [employer] in [employer].      Tries to remain active; 5 miles per day.               Advance Care Planning    Discussed advance care planning with patient; informed AVS has link to [employer].        6/1/2025   General Health   How would you rate your overall physical health? Excellent   Feel stress (tense, anxious, or unable to sleep) Only a little   (!) STRESS CONCERN      6/1/2025   Nutrition   Three or more servings of calcium each day? Yes   Diet: Regular (no restrictions)   How many servings of fruit and vegetables per day? (!) 0-1   How many sweetened beverages each day? 0-1         6/1/2025   Exercise   Days per week of moderate/strenous exercise 6 days   Average minutes spent exercising at this level 60 min         6/1/2025   Social Factors   Frequency of gathering with friends or relatives More than three times a week   Worry food won't last until get money to buy more No   Food not last or not have enough money for food? No   Do you have housing? (Housing is defined as stable permanent housing and does not include staying outside in a car, in a tent, in an abandoned building, in an overnight shelter, or couch-surfing.) Yes   Are you worried about losing your housing? No   Lack of transportation? No   Unable to get utilities (heat,electricity)? No         6/1/2025   Dental   Dentist two times every year? (!) NO         Today's PHQ-2 Score:       6/1/2025     7:38 PM   PHQ-2 ( 1999 [employer])   Q1: Little interest or pleasure in doing things 0   Q2: Feeling down, depressed or hopeless 0   PHQ-2 Score 0    Q1: Little interest or pleasure in doing things Not at all   Q2: Feeling down, depressed or hopeless Not at all   PHQ-2 Score 0       Patient-reported           6/1/2025   Substance Use   Alcohol more than 3/day or more than  7/wk Not Applicable   Do you use any other substances recreationally? No     Social History     Tobacco Use    Smoking status: Former     Types: Hookah     Passive exposure: Never    Smokeless tobacco: Never   Vaping Use    Vaping status: Never Used   Substance Use Topics    Alcohol use: Never    Drug use: Never             6/1/2025   One time HIV Screening   Previous HIV test? I don't know         6/1/2025   STI Screening   New sexual partner(s) since last STI/HIV test? No   ASCVD Risk   The 10-year ASCVD risk score (Lilian MEDINA, et al., 2019) is: 3.2%    Values used to calculate the score:      Age: 44 years      Sex: Male      Is Non- : Yes      Diabetic: No      Tobacco smoker: No      Systolic Blood Pressure: 118 mmHg      Is BP treated: No      HDL Cholesterol: 51 mg/dL      Total Cholesterol: 175 mg/dL        6/1/2025   Contraception/Family Planning   Questions about contraception or family planning (!) DECLINE        Reviewed and updated as needed this visit by Provider                    Past Medical History:   Diagnosis Date    Congenital heart anomaly     Hyperlipidemia      Past Surgical History:   Procedure Laterality Date    CARDIAC SURGERY      repair of double chamber left ventricle aged 28    CYSTOSCOPY, RETROGRADES, EXTRACT STONE, INSERT STENT, COMBINED Left 07/24/2019    Procedure: Cystoscopy, left retrograde pyelogram, interpretation of fluoroscopic images, left ureteroscopy with stone basketing, placement of 5 x 26 double-J left ureteral stent, laser on stand-by;  Surgeon: Gavino Delarosa MD;  Location:  OR         Review of Systems  Constitutional, HEENT, cardiovascular, pulmonary, GI, , musculoskeletal, neuro, skin, endocrine and psych systems are negative, except as otherwise noted.     Objective    Exam  /76 (BP Location: Right arm, Patient Position: Sitting, Cuff Size: Adult Regular)   Pulse 67   Temp 97.1  F (36.2  C) (Temporal)   Resp 16  "  Ht 1.702 m (5' 7\")   Wt 93.4 kg (206 lb)   SpO2 96%   BMI 32.26 kg/m     Estimated body mass index is 32.26 kg/m  as calculated from the following:    Height as of this encounter: 1.702 m (5' 7\").    Weight as of this encounter: 93.4 kg (206 lb).    Physical Exam  GENERAL: alert and no distress  EYES: Eyes grossly normal to inspection, PERRL and conjunctivae and sclerae normal  HENT: ear canals and TM's normal, nose and mouth without ulcers or lesions  NECK: no adenopathy, no asymmetry, masses, or scars  RESP: lungs clear to auscultation - no rales, rhonchi or wheezes  CV: regular rate and rhythm, normal S1 S2, no S3 or S4, no murmur, click or rub, no peripheral edema  ABDOMEN: soft, nontender, no hepatosplenomegaly, no masses and bowel sounds normal   (male): normal male genitalia without lesions or urethral discharge, no hernia  MS: no gross musculoskeletal defects noted, no edema  SKIN: no suspicious lesions or rashes  NEURO: Normal strength and tone, mentation intact and speech normal  PSYCH: mentation appears normal, affect normal/bright        Signed Electronically by: Wilber Lofton MD    "

## (undated) DEVICE — SOL WATER IRRIG 1000ML BOTTLE 2F7114

## (undated) DEVICE — PREP TECHNI-CARE CHLOROXYLENOL 3% 4OZ BOTTLE C222-4ZWO

## (undated) DEVICE — GUIDEWIRE URO STR STIFF .035"X150CM NITINOL 150NSS35

## (undated) DEVICE — RAD RX ISOVUE 300 (50ML) 61% IOPAMIDOL CHARGE PER ML

## (undated) DEVICE — LINEN HALF SHEET 5512

## (undated) DEVICE — BAG CLEAR TRASH 1.3M 39X33" P4040C

## (undated) DEVICE — PACK CYSTO CUSTOM RIDGES

## (undated) DEVICE — LINEN FULL SHEET 5511

## (undated) DEVICE — SOL NACL 0.9% IRRIG 3000ML BAG 2B7477

## (undated) DEVICE — TUBING IRRIG TUR Y TYPE 96" LF 6543-01

## (undated) DEVICE — COVER FOOTSWITCH W/CINCH 20X24" 923267

## (undated) DEVICE — GLOVE PROTEXIS POWDER FREE SMT 7.5  2D72PT75X

## (undated) RX ORDER — PROPOFOL 10 MG/ML
INJECTION, EMULSION INTRAVENOUS
Status: DISPENSED
Start: 2019-07-24

## (undated) RX ORDER — ONDANSETRON 2 MG/ML
INJECTION INTRAMUSCULAR; INTRAVENOUS
Status: DISPENSED
Start: 2019-07-24

## (undated) RX ORDER — EPHEDRINE SULFATE 50 MG/ML
INJECTION, SOLUTION INTRAMUSCULAR; INTRAVENOUS; SUBCUTANEOUS
Status: DISPENSED
Start: 2019-07-24

## (undated) RX ORDER — ATROPA BELLADONNA AND OPIUM 16.2; 3 MG/1; MG/1
SUPPOSITORY RECTAL
Status: DISPENSED
Start: 2019-07-24

## (undated) RX ORDER — GLYCOPYRROLATE 0.2 MG/ML
INJECTION INTRAMUSCULAR; INTRAVENOUS
Status: DISPENSED
Start: 2019-07-24

## (undated) RX ORDER — DEXAMETHASONE SODIUM PHOSPHATE 4 MG/ML
INJECTION, SOLUTION INTRA-ARTICULAR; INTRALESIONAL; INTRAMUSCULAR; INTRAVENOUS; SOFT TISSUE
Status: DISPENSED
Start: 2019-07-24

## (undated) RX ORDER — FENTANYL CITRATE 50 UG/ML
INJECTION, SOLUTION INTRAMUSCULAR; INTRAVENOUS
Status: DISPENSED
Start: 2019-07-24

## (undated) RX ORDER — CEFAZOLIN SODIUM 2 G/100ML
INJECTION, SOLUTION INTRAVENOUS
Status: DISPENSED
Start: 2019-07-24

## (undated) RX ORDER — LIDOCAINE HYDROCHLORIDE 10 MG/ML
INJECTION, SOLUTION EPIDURAL; INFILTRATION; INTRACAUDAL; PERINEURAL
Status: DISPENSED
Start: 2019-07-24